# Patient Record
Sex: FEMALE | Race: WHITE | NOT HISPANIC OR LATINO
[De-identification: names, ages, dates, MRNs, and addresses within clinical notes are randomized per-mention and may not be internally consistent; named-entity substitution may affect disease eponyms.]

---

## 2018-07-03 ENCOUNTER — TRANSCRIPTION ENCOUNTER (OUTPATIENT)
Age: 63
End: 2018-07-03

## 2020-08-12 ENCOUNTER — APPOINTMENT (OUTPATIENT)
Dept: OBGYN | Facility: CLINIC | Age: 65
End: 2020-08-12

## 2020-09-10 ENCOUNTER — TRANSCRIPTION ENCOUNTER (OUTPATIENT)
Age: 65
End: 2020-09-10

## 2020-09-18 ENCOUNTER — APPOINTMENT (OUTPATIENT)
Dept: OBGYN | Facility: CLINIC | Age: 65
End: 2020-09-18

## 2020-10-02 ENCOUNTER — TRANSCRIPTION ENCOUNTER (OUTPATIENT)
Age: 65
End: 2020-10-02

## 2020-11-15 ENCOUNTER — TRANSCRIPTION ENCOUNTER (OUTPATIENT)
Age: 65
End: 2020-11-15

## 2021-02-04 ENCOUNTER — TRANSCRIPTION ENCOUNTER (OUTPATIENT)
Age: 66
End: 2021-02-04

## 2021-06-21 ENCOUNTER — APPOINTMENT (OUTPATIENT)
Dept: OBGYN | Facility: CLINIC | Age: 66
End: 2021-06-21
Payer: COMMERCIAL

## 2021-06-21 VITALS
BODY MASS INDEX: 43.54 KG/M2 | WEIGHT: 255 LBS | SYSTOLIC BLOOD PRESSURE: 148 MMHG | DIASTOLIC BLOOD PRESSURE: 84 MMHG | TEMPERATURE: 97.1 F | HEIGHT: 64 IN

## 2021-06-21 DIAGNOSIS — Z78.9 OTHER SPECIFIED HEALTH STATUS: ICD-10-CM

## 2021-06-21 DIAGNOSIS — Z01.419 ENCOUNTER FOR GYNECOLOGICAL EXAMINATION (GENERAL) (ROUTINE) W/OUT ABNORMAL FINDINGS: ICD-10-CM

## 2021-06-21 DIAGNOSIS — Z84.2 FAMILY HISTORY OF OTHER DISEASES OF THE GENITOURINARY SYSTEM: ICD-10-CM

## 2021-06-21 PROCEDURE — 99386 PREV VISIT NEW AGE 40-64: CPT

## 2021-06-21 PROCEDURE — 99072 ADDL SUPL MATRL&STAF TM PHE: CPT

## 2021-06-21 NOTE — PLAN
[FreeTextEntry1] : 65  postmenopausal F presents for annual exam\par \par #HCM\par -f/u pap/hpv\par -mammogram and dexa scan referral given \par -pt to establish care with PCP \par \par #Vulvar erythema\par -Likely fungal, Rx for nystatin sent

## 2021-06-21 NOTE — HISTORY OF PRESENT ILLNESS
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

## 2021-06-21 NOTE — PHYSICAL EXAM
[Appropriately responsive] : appropriately responsive [Alert] : alert [No Acute Distress] : no acute distress [Soft] : soft [Non-tender] : non-tender [Non-distended] : non-distended [Oriented x3] : oriented x3 [Examination Of The Breasts] : a normal appearance [No Masses] : no breast masses were palpable [Labia Majora] : normal [Labia Minora] : normal [Normal] : normal [Vulvar Atrophy] : vulvar atrophy [Atrophy] : atrophy [Uterine Adnexae] : non-palpable [FreeTextEntry1] : erythema noted on external genitalia

## 2021-06-23 LAB — HPV HIGH+LOW RISK DNA PNL CVX: NOT DETECTED

## 2021-06-25 ENCOUNTER — APPOINTMENT (OUTPATIENT)
Dept: RADIOLOGY | Facility: CLINIC | Age: 66
End: 2021-06-25

## 2021-06-25 LAB — CYTOLOGY CVX/VAG DOC THIN PREP: NORMAL

## 2021-07-02 ENCOUNTER — RESULT REVIEW (OUTPATIENT)
Age: 66
End: 2021-07-02

## 2021-07-02 ENCOUNTER — APPOINTMENT (OUTPATIENT)
Dept: MAMMOGRAPHY | Facility: CLINIC | Age: 66
End: 2021-07-02
Payer: COMMERCIAL

## 2021-07-02 ENCOUNTER — APPOINTMENT (OUTPATIENT)
Dept: RADIOLOGY | Facility: CLINIC | Age: 66
End: 2021-07-02
Payer: COMMERCIAL

## 2021-07-02 DIAGNOSIS — N95.0 POSTMENOPAUSAL BLEEDING: ICD-10-CM

## 2021-07-02 PROCEDURE — 77085 DXA BONE DENSITY AXL VRT FX: CPT

## 2021-07-02 PROCEDURE — 77063 BREAST TOMOSYNTHESIS BI: CPT

## 2021-07-02 PROCEDURE — 77067 SCR MAMMO BI INCL CAD: CPT

## 2021-07-07 ENCOUNTER — NON-APPOINTMENT (OUTPATIENT)
Age: 66
End: 2021-07-07

## 2021-08-13 PROBLEM — N95.0 POSTMENOPAUSAL BLEEDING: Status: ACTIVE | Noted: 2021-08-13

## 2021-08-16 ENCOUNTER — NON-APPOINTMENT (OUTPATIENT)
Age: 66
End: 2021-08-16

## 2021-08-26 ENCOUNTER — NON-APPOINTMENT (OUTPATIENT)
Age: 66
End: 2021-08-26

## 2021-08-26 ENCOUNTER — APPOINTMENT (OUTPATIENT)
Dept: ULTRASOUND IMAGING | Facility: CLINIC | Age: 66
End: 2021-08-26
Payer: COMMERCIAL

## 2021-08-26 PROCEDURE — 76856 US EXAM PELVIC COMPLETE: CPT

## 2021-08-26 PROCEDURE — 76830 TRANSVAGINAL US NON-OB: CPT

## 2021-08-27 ENCOUNTER — NON-APPOINTMENT (OUTPATIENT)
Age: 66
End: 2021-08-27

## 2021-08-27 ENCOUNTER — APPOINTMENT (OUTPATIENT)
Dept: ORTHOPEDIC SURGERY | Facility: CLINIC | Age: 66
End: 2021-08-27
Payer: COMMERCIAL

## 2021-08-27 VITALS — WEIGHT: 255 LBS | HEIGHT: 64 IN | BODY MASS INDEX: 43.54 KG/M2

## 2021-08-27 PROCEDURE — 99204 OFFICE O/P NEW MOD 45 MIN: CPT

## 2021-08-27 PROCEDURE — 73502 X-RAY EXAM HIP UNI 2-3 VIEWS: CPT | Mod: LT

## 2021-08-27 NOTE — DISCUSSION/SUMMARY
[de-identified] : This patient is severe left hip osteoarthritis.  The patient is not an appropriate candidate for surgical intervention at this time because her BMI is greater than 40 increasing surgical risk. An extensive discussion was conducted on the natural history of the disease and the variety of surgical and non-surgical options available to the patient including, but not limited to non-steroidal anti-inflammatory medications, steroid injections, physical therapy, maintenance of ideal body weight, and reduction of activity.  I prescribed a course of meloxicam.\par The patient will schedule an appointment as needed.\par \par The patient has been counseled regarding the elevated risks associated with surgical complications in patients with a BMI>35.  I explained to the patient the risk of obesity, which is well documented in the orthopedic literature as increasing risks of wound breakdown (dehiscence), drainage, periprosthetic joint infection, dissatisfaction, chronic pain, early failure and/or loosening of the prosthesis, and impaired overall outcome to the patient. The patient demonstrates a profound understanding of the increased risk. Nutritionist referral, methods of monitoring nutrition intake and calorie counting and bariatric surgery options were discussed with the patient. After a lengthy discussion, the patient agreed to make a coordinated effort at weight loss. The patient understands our BMI policy and if they are planning surgical intervention, then they will need to bring their BMI below 40 in order to proceed and they are agreeable to this.

## 2021-08-27 NOTE — HISTORY OF PRESENT ILLNESS
[de-identified] : This is very nice 65 year-old female experiencing left hip and groin and thigh pain, which is severe in intensity. The pain substantially limits activities of daily living. Walking tolerance is reduced. Medication and activity modification have been minimally effective for a period lasting greater than three months in duration. Assistive devices and external support were not deemed by the patient to be helpful in improving their function. Due to the severity of osteoarthritis and level of pain, physical therapy is contraindicated. Pain and restriction of function are intolerable at this time. The patient denies any radiation of the pain to the feet and it is not associated with numbness, tingling, or weakness.  She tried a left hip intra-articular cortisone injection which made the pain worse.  No attempts at weight loss and BMI is 44.

## 2021-08-27 NOTE — PHYSICAL EXAM
[de-identified] : Patient is well nourished, well-developed, in no acute distress, with appropriate mood and affect. The patient is oriented to time, place, and person. Respirations are even and unlabored. Gait evaluation reveals a limp. There is no inguinal adenopathy. Examination of the contralateral hip shows normal range of motion, strength, no tenderness, and intact skin. The affected limb is well-perfused, shows a grossly normal motor and sensory examination. Examination of the hip shows no skin lesions. Hip motion is reduced and causes pain. FADIR is positive and MURRAY is positive. Stinchfield test is positive. Leg lengths are approximately equal. Both hips are stable and muscle strength is normal. Pedal pulses are palpable. [de-identified] : AP and lateral x-rays of the left hip, pelvis, and femur were ordered and taken in the office and demonstrate degenerative joint disease of the hip with joint space narrowing, osteophyte formation, and subchondral sclerosis.\par \par The patient brings with her an MRI of the left hip.  Reviewed the MR imaging with the patient was demonstrates severe left hip osteoarthritis.

## 2021-09-08 ENCOUNTER — NON-APPOINTMENT (OUTPATIENT)
Age: 66
End: 2021-09-08

## 2021-09-10 ENCOUNTER — NON-APPOINTMENT (OUTPATIENT)
Age: 66
End: 2021-09-10

## 2021-09-10 ENCOUNTER — APPOINTMENT (OUTPATIENT)
Dept: INTERNAL MEDICINE | Facility: CLINIC | Age: 66
End: 2021-09-10
Payer: COMMERCIAL

## 2021-09-10 VITALS — DIASTOLIC BLOOD PRESSURE: 84 MMHG | WEIGHT: 236 LBS | BODY MASS INDEX: 40.51 KG/M2 | SYSTOLIC BLOOD PRESSURE: 145 MMHG

## 2021-09-10 DIAGNOSIS — M25.552 PAIN IN LEFT HIP: ICD-10-CM

## 2021-09-10 PROCEDURE — G0442 ANNUAL ALCOHOL SCREEN 15 MIN: CPT

## 2021-09-10 PROCEDURE — 99386 PREV VISIT NEW AGE 40-64: CPT | Mod: 25

## 2021-09-10 PROCEDURE — G0444 DEPRESSION SCREEN ANNUAL: CPT | Mod: 59

## 2021-09-13 PROBLEM — M25.552 LEFT HIP PAIN: Status: ACTIVE | Noted: 2021-09-13

## 2021-09-13 NOTE — ASSESSMENT
[FreeTextEntry1] : Chronic use of GH supplement (Norditropin) - she reports has been taking this since age 50 when 'profound deficiency' was discovered. would like records from prescribing provider.\par \par Annual alcohol screen completed this visit. Alcohol use within healthy limits.  Healthy drinking guidelines shared with patient (Male no more than 4 SSD on any day, no more than 14 SSD per week; Female and male overage 65 no more than 3 SSD on any day, no more than 7 per week). Positive reinforcement provided given patient currently within healthy guidelines. \par \par Annual depression screen completed this visit and reviewed.  Screening not suggestive of diagnosis of MDD.

## 2021-09-13 NOTE — HEALTH RISK ASSESSMENT
[Patient reported PAP Smear was normal] : Patient reported PAP Smear was normal [Patient reported bone density results were normal] : Patient reported bone density results were normal [Never (0 pts)] : Never (0 points) [No falls in past year] : Patient reported no falls in the past year [0] : 2) Feeling down, depressed, or hopeless: Not at all (0) [No Retinopathy] : No retinopathy [Patient reported colonoscopy was normal] : Patient reported colonoscopy was normal [HIV test declined] : HIV test declined [None] : None [Alone] : lives alone [Employed] : employed [Single] : single [Sexually Active] : sexually active [Feels Safe at Home] : Feels safe at home [Fully functional (bathing, dressing, toileting, transferring, walking, feeding)] : Fully functional (bathing, dressing, toileting, transferring, walking, feeding) [Fully functional (using the telephone, shopping, preparing meals, housekeeping, doing laundry, using] : Fully functional and needs no help or supervision to perform IADLs (using the telephone, shopping, preparing meals, housekeeping, doing laundry, using transportation, managing medications and managing finances) [Smoke Detector] : smoke detector [Seat Belt] :  uses seat belt [With Patient/Caregiver] : , with patient/caregiver [Name: ___] : Health Care Proxy's Name: [unfilled]  [Relationship: ___] : Relationship: [unfilled] [No] : No [PHQ-2 Negative - No further assessment needed] : PHQ-2 Negative - No further assessment needed [] : No [de-identified] : stopped ETOH completely 2 weeks ago.  [de-identified] : walking in the pool - 5 times a week [de-identified] : trying "17 day diet" - high protein, low sugur - concept close to [place] diet. today is day 9 [[id number]] : 0 [EyeExamDate] : 09/20 [Change in mental status noted] : No change in mental status noted [Reports changes in hearing] : Reports no changes in hearing [Reports changes in vision] : Reports no changes in vision [Carbon Monoxide Detector] : no carbon monoxide detector [MammogramDate] : 07/21 [MammogramComments] : josh 3 - f/u in 6 months [PapSmearDate] : 06/21 [BoneDensityDate] : 07/21 [ColonoscopyDate] : 01/2019 [ColonoscopyComments] : Dr Redd [de-identified] : with cat [AdvancecareDate] : 09/21

## 2021-09-13 NOTE — PHYSICAL EXAM
[No Acute Distress] : no acute distress [Normal Sclera/Conjunctiva] : normal sclera/conjunctiva [EOMI] : extraocular movements intact [No Respiratory Distress] : no respiratory distress  [Clear to Auscultation] : lungs were clear to auscultation bilaterally [Normal Rate] : normal rate  [Regular Rhythm] : with a regular rhythm [Normal S1, S2] : normal S1 and S2 [Pedal Pulses Present] : the pedal pulses are present [No Edema] : there was no peripheral edema [Normal Appearance] : normal in appearance [No Masses] : no palpable masses [No Nipple Discharge] : no nipple discharge [No Axillary Lymphadenopathy] : no axillary lymphadenopathy [Soft] : abdomen soft [Non Tender] : non-tender [Normal Bowel Sounds] : normal bowel sounds [Normal Anterior Cervical Nodes] : no anterior cervical lymphadenopathy [No CVA Tenderness] : no CVA  tenderness [No Spinal Tenderness] : no spinal tenderness [Grossly Normal Strength/Tone] : grossly normal strength/tone [No Rash] : no rash [Coordination Grossly Intact] : coordination grossly intact [No Focal Deficits] : no focal deficits [Normal Affect] : the affect was normal

## 2021-09-13 NOTE — HISTORY OF PRESENT ILLNESS
[FreeTextEntry1] : New patient to establish medical care and for annual CPE.\par under pressure to lose weight for upcoming hip surgery - she would like to begin medication to support her wt loss asap.  [de-identified] : Left hip pain since march - probably needs THR - she has been told she has to get wt off for them to do surgery.y - That is the issue that is most impt to her. \par RE hip - does exercise in pool on a regular basis. has tried injections, acupuncture, \par RE wt loss: has cut out sugar and ETOH. She is under pressure to lose enough wt to proceed with surgery.  Also feels pressure as she needs to return to in person work which requires her to be able to stand, walk, function better than she is doing with hip in its current shape. \par She wants to use medication to support her efforts - feels she has not been able to make sufficient progress without help. \par Was given meloxicam for pain which is helping her. \par Does meditation daily - inside timer oniel\par \jaxon Reports she has been taking GH (Norditropin) since age 50.

## 2021-09-23 ENCOUNTER — APPOINTMENT (OUTPATIENT)
Dept: OBGYN | Facility: CLINIC | Age: 66
End: 2021-09-23
Payer: COMMERCIAL

## 2021-09-23 VITALS
WEIGHT: 236 LBS | DIASTOLIC BLOOD PRESSURE: 85 MMHG | HEIGHT: 64 IN | SYSTOLIC BLOOD PRESSURE: 147 MMHG | BODY MASS INDEX: 40.29 KG/M2

## 2021-09-23 PROCEDURE — 58100 BIOPSY OF UTERUS LINING: CPT

## 2021-09-23 NOTE — PROCEDURE
[Endometrial Biopsy] : Endometrial biopsy [Irregular Bleeding] : irregular uterine bleeding [Post-Menop. Bleeding] : post-menopausal bleeding [Risks] : risks [Benefits] : benefits [Alternatives] : alternatives [Patient] : patient [Infection] : infection [Uterine Perforation] : uterine perforation [Bleeding] : bleeding [No Premedication] : No premedication [Paracervical Block] : paracervical block was performed [___ mL Injected] : [unfilled] ~UmL of lidocaine [0.01] : 0.01 [Tenaculum] : Tenaculum [Easy Passage] : Easy passage [Sounded to ___ cm] : sounded to [unfilled] ~Ucm [Anteverted] : anteverted [Scant] : scant [Sent to Pathology] : placed in buffered formalin and sent for pathology [Tolerated Well] : Patient tolerated the procedure well [No Complications] : No complications

## 2021-09-24 ENCOUNTER — APPOINTMENT (OUTPATIENT)
Dept: BARIATRICS/WEIGHT MGMT | Facility: CLINIC | Age: 66
End: 2021-09-24
Payer: COMMERCIAL

## 2021-09-24 PROCEDURE — 99205 OFFICE O/P NEW HI 60 MIN: CPT | Mod: 95

## 2021-09-24 RX ORDER — SOMATROPIN 5 MG/1.5ML
5 INJECTION, SOLUTION SUBCUTANEOUS
Refills: 0 | Status: ACTIVE | COMMUNITY
Start: 2021-09-24

## 2021-09-30 LAB — CORE LAB BIOPSY: NORMAL

## 2021-10-01 ENCOUNTER — APPOINTMENT (OUTPATIENT)
Dept: BARIATRICS/WEIGHT MGMT | Facility: CLINIC | Age: 66
End: 2021-10-01
Payer: COMMERCIAL

## 2021-10-01 PROCEDURE — 99214 OFFICE O/P EST MOD 30 MIN: CPT | Mod: 95

## 2021-10-01 NOTE — ASSESSMENT
[FreeTextEntry1] : Plan: \par \par Continue whole food migrate to more plant based plus fish\par avoid processed foods\par would add vegetables to lunch\par recommended fruit 2-3 a day , minimize shakes \par will reach out to pharmacy regarding saxenda prescription\par \par f/u 1 week TEB

## 2021-10-01 NOTE — HISTORY OF PRESENT ILLNESS
[FreeTextEntry1] : Has not weighed herself, will be going next week for pre-surgical testing. \par \par Patient has been having increased left hip pain- which needs replacing\par Reports increase fatigue \par \par She went to the office 1 day this week, the commute was very hard on her body. Needs to go in every 2 weeks \par She had 2 glasses of wine that night \par Decreased coffee to 1 day a week with almond milk\par Purchased non-dairy milks etc, and more whole food plant based foods \par B: eggs and orange\par L: 2 fruit smoothie with protein powder smoothie \par D: salmon/chicken with broccoli, piece of fruit \par \par She had an endometrial lining biopsy, no cancer but needs a D&C at the end of this month \par \par Started taking CBD oil on sunday for sleep and pain \par \par Taking Colace for BM\par stopped green tea\par having more regular bowel movements

## 2021-10-07 ENCOUNTER — OUTPATIENT (OUTPATIENT)
Dept: OUTPATIENT SERVICES | Facility: HOSPITAL | Age: 66
LOS: 1 days | End: 2021-10-07

## 2021-10-07 VITALS
SYSTOLIC BLOOD PRESSURE: 147 MMHG | WEIGHT: 233.91 LBS | RESPIRATION RATE: 14 BRPM | HEART RATE: 75 BPM | TEMPERATURE: 99 F | OXYGEN SATURATION: 98 % | HEIGHT: 63.5 IN | DIASTOLIC BLOOD PRESSURE: 74 MMHG

## 2021-10-07 DIAGNOSIS — N84.0 POLYP OF CORPUS UTERI: ICD-10-CM

## 2021-10-07 DIAGNOSIS — Z98.890 OTHER SPECIFIED POSTPROCEDURAL STATES: Chronic | ICD-10-CM

## 2021-10-07 LAB
ANION GAP SERPL CALC-SCNC: 15 MMOL/L — HIGH (ref 7–14)
BUN SERPL-MCNC: 31 MG/DL — HIGH (ref 7–23)
CALCIUM SERPL-MCNC: 10.2 MG/DL — SIGNIFICANT CHANGE UP (ref 8.4–10.5)
CHLORIDE SERPL-SCNC: 102 MMOL/L — SIGNIFICANT CHANGE UP (ref 98–107)
CO2 SERPL-SCNC: 24 MMOL/L — SIGNIFICANT CHANGE UP (ref 22–31)
CREAT SERPL-MCNC: 0.82 MG/DL — SIGNIFICANT CHANGE UP (ref 0.5–1.3)
GLUCOSE SERPL-MCNC: 78 MG/DL — SIGNIFICANT CHANGE UP (ref 70–99)
HCT VFR BLD CALC: 40.8 % — SIGNIFICANT CHANGE UP (ref 34.5–45)
HGB BLD-MCNC: 13.8 G/DL — SIGNIFICANT CHANGE UP (ref 11.5–15.5)
MCHC RBC-ENTMCNC: 33.8 GM/DL — SIGNIFICANT CHANGE UP (ref 32–36)
MCHC RBC-ENTMCNC: 33.9 PG — SIGNIFICANT CHANGE UP (ref 27–34)
MCV RBC AUTO: 100.2 FL — HIGH (ref 80–100)
NRBC # BLD: 0 /100 WBCS — SIGNIFICANT CHANGE UP
NRBC # FLD: 0 K/UL — SIGNIFICANT CHANGE UP
PLATELET # BLD AUTO: 256 K/UL — SIGNIFICANT CHANGE UP (ref 150–400)
POTASSIUM SERPL-MCNC: 4.3 MMOL/L — SIGNIFICANT CHANGE UP (ref 3.5–5.3)
POTASSIUM SERPL-SCNC: 4.3 MMOL/L — SIGNIFICANT CHANGE UP (ref 3.5–5.3)
RBC # BLD: 4.07 M/UL — SIGNIFICANT CHANGE UP (ref 3.8–5.2)
RBC # FLD: 12 % — SIGNIFICANT CHANGE UP (ref 10.3–14.5)
SODIUM SERPL-SCNC: 141 MMOL/L — SIGNIFICANT CHANGE UP (ref 135–145)
WBC # BLD: 9.12 K/UL — SIGNIFICANT CHANGE UP (ref 3.8–10.5)
WBC # FLD AUTO: 9.12 K/UL — SIGNIFICANT CHANGE UP (ref 3.8–10.5)

## 2021-10-07 NOTE — H&P PST ADULT - MUSCULOSKELETAL COMMENTS
left hip, and knee arthritis-  will have hip replacement in the next few months left hip pain with rom

## 2021-10-07 NOTE — H&P PST ADULT - PROBLEM SELECTOR PLAN 1
Pt scheduled for dilation curettage, operative hysteroscopy, polypectomy with myosure on 10/21/2021.  labs done results pending, ekg in chart.  Pt scheduled for preop covid testing.  Preop teaching done, pt able to verbalize understanding.  anesthesia- TIA Precautions     pst request  echo 2006 [facility]

## 2021-10-07 NOTE — H&P PST ADULT - NSANTHOSAYNRD_GEN_A_CORE
tested neg/No. TIA screening performed.  STOP BANG Legend: 0-2 = LOW Risk; 3-4 = INTERMEDIATE Risk; 5-8 = HIGH Risk tested neg, neck 17 inches/No. TIA screening performed.  STOP BANG Legend: 0-2 = LOW Risk; 3-4 = INTERMEDIATE Risk; 5-8 = HIGH Risk

## 2021-10-07 NOTE — H&P PST ADULT - HISTORY OF PRESENT ILLNESS
64y/o female scheduled for dilation curettage operative hysterectomy, polypectomy with myosure on 10/21/2021.  Pt states, "developed postmenopausal bleeding 7/2021 for several days, US endometrial thickening,  Endometrial bx neg." 64y/o female scheduled for dilation curettage operative hysterectomy, polypectomy with myosure on 10/21/2021.  Pt states, "developed postmenopausal bleeding 7/2021 for several days, US endometrial thickening, polyp,  Endometrial bx neg."

## 2021-10-07 NOTE — H&P PST ADULT - NSICDXPASTMEDICALHX_GEN_ALL_CORE_FT
PAST MEDICAL HISTORY:  Arthritis left hip    Growth hormone deficiency (human)     Obese     Polyp of corpus uteri

## 2021-10-07 NOTE — H&P PST ADULT - NSICDXPASTSURGICALHX_GEN_ALL_CORE_FT
PAST SURGICAL HISTORY:  S/P bilateral breast reduction 2005    S/P dilatation and curettage 2014, 2017

## 2021-10-08 ENCOUNTER — APPOINTMENT (OUTPATIENT)
Dept: BARIATRICS/WEIGHT MGMT | Facility: CLINIC | Age: 66
End: 2021-10-08
Payer: COMMERCIAL

## 2021-10-08 VITALS — WEIGHT: 235 LBS | HEIGHT: 64 IN | BODY MASS INDEX: 40.12 KG/M2

## 2021-10-08 PROCEDURE — 99443: CPT

## 2021-10-08 NOTE — HISTORY OF PRESENT ILLNESS
[FreeTextEntry1] : This is a 65 year female  present in office today for initial weight management visit. 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 \par She has not been eating any pasta, bread, fruit, no alcohol and minimal caffeine in the last 3 weeks \par Reporting constipation, on meloxicam taking colace \par \par She's on a "17 day diet" \par Takes flaxseed oil and lemon water in morning \par B: low fat cottage cheese with fruit, 2 eggs and fat free sour cream and salmon \par L: fruit smoothie with yogurt with protein powder, omelette with goat cheese, GF pasta with red sauce, chicken piccata with broccoli , smoked salmon    \par 730pm D: salmon with vegetable, soups from [employer] vegetable soup, corn tortilla soup, amys organic \par 1/2 cottage cheese and fruit \par Drinks green tea 2x/day\par Drinks at least 8 -16 ounce jesika spring bottles a day \par \par Sleeps poorly, wakes up 2-3x/day to use the bathroom   \par \par \par Does not have a car, takes cabs \par \par \par Exercises\par \par denies cigarette, denies  recreational drugs \par \par

## 2021-10-14 ENCOUNTER — APPOINTMENT (OUTPATIENT)
Dept: ORTHOPEDIC SURGERY | Facility: CLINIC | Age: 66
End: 2021-10-14

## 2021-10-17 DIAGNOSIS — Z01.818 ENCOUNTER FOR OTHER PREPROCEDURAL EXAMINATION: ICD-10-CM

## 2021-10-18 ENCOUNTER — APPOINTMENT (OUTPATIENT)
Dept: DISASTER EMERGENCY | Facility: CLINIC | Age: 66
End: 2021-10-18

## 2021-10-19 ENCOUNTER — APPOINTMENT (OUTPATIENT)
Dept: BARIATRICS/WEIGHT MGMT | Facility: CLINIC | Age: 66
End: 2021-10-19
Payer: COMMERCIAL

## 2021-10-19 VITALS — BODY MASS INDEX: 39.09 KG/M2 | WEIGHT: 229 LBS | HEIGHT: 64 IN

## 2021-10-19 PROBLEM — E23.0 HYPOPITUITARISM: Chronic | Status: ACTIVE | Noted: 2021-10-07

## 2021-10-19 PROBLEM — N84.0 POLYP OF CORPUS UTERI: Chronic | Status: ACTIVE | Noted: 2021-10-07

## 2021-10-19 PROBLEM — E66.9 OBESITY, UNSPECIFIED: Chronic | Status: ACTIVE | Noted: 2021-10-07

## 2021-10-19 PROBLEM — M19.90 UNSPECIFIED OSTEOARTHRITIS, UNSPECIFIED SITE: Chronic | Status: ACTIVE | Noted: 2021-10-07

## 2021-10-19 LAB — SARS-COV-2 N GENE NPH QL NAA+PROBE: NOT DETECTED

## 2021-10-19 PROCEDURE — 99443: CPT

## 2021-10-20 ENCOUNTER — TRANSCRIPTION ENCOUNTER (OUTPATIENT)
Age: 66
End: 2021-10-20

## 2021-10-21 ENCOUNTER — RESULT REVIEW (OUTPATIENT)
Age: 66
End: 2021-10-21

## 2021-10-21 ENCOUNTER — OUTPATIENT (OUTPATIENT)
Dept: OUTPATIENT SERVICES | Facility: HOSPITAL | Age: 66
LOS: 1 days | Discharge: HOME | End: 2021-10-21
Payer: COMMERCIAL

## 2021-10-21 ENCOUNTER — APPOINTMENT (OUTPATIENT)
Dept: OBGYN | Facility: CLINIC | Age: 66
End: 2021-10-21

## 2021-10-21 VITALS
OXYGEN SATURATION: 99 % | RESPIRATION RATE: 18 BRPM | DIASTOLIC BLOOD PRESSURE: 54 MMHG | TEMPERATURE: 98 F | SYSTOLIC BLOOD PRESSURE: 145 MMHG | HEART RATE: 67 BPM

## 2021-10-21 VITALS
HEART RATE: 65 BPM | TEMPERATURE: 98 F | SYSTOLIC BLOOD PRESSURE: 141 MMHG | OXYGEN SATURATION: 99 % | WEIGHT: 233.91 LBS | DIASTOLIC BLOOD PRESSURE: 62 MMHG | HEIGHT: 63.5 IN | RESPIRATION RATE: 18 BRPM

## 2021-10-21 DIAGNOSIS — N84.0 POLYP OF CORPUS UTERI: ICD-10-CM

## 2021-10-21 DIAGNOSIS — Z98.890 OTHER SPECIFIED POSTPROCEDURAL STATES: Chronic | ICD-10-CM

## 2021-10-21 PROCEDURE — 88305 TISSUE EXAM BY PATHOLOGIST: CPT | Mod: 26

## 2021-10-21 PROCEDURE — 58558 HYSTEROSCOPY BIOPSY: CPT

## 2021-10-21 NOTE — ASU PREOPERATIVE ASSESSMENT, ADULT (IPARK ONLY) - PRO INTERPRETER NEED 2
Quality 130: Documentation Of Current Medications In The Medical Record: Current Medications Documented
Quality 111:Pneumonia Vaccination Status For Older Adults: Pneumococcal Vaccination not Administered or Previously Received, Reason not Otherwise Specified
Detail Level: Detailed
Quality 110: Preventive Care And Screening: Influenza Immunization: Influenza Immunization not Administered for Documented Reasons.
English

## 2021-10-21 NOTE — ASU DISCHARGE PLAN (ADULT/PEDIATRIC) - CARE PROVIDER_API CALL
Mrs. Smith presents for evaluation of colon cancer screening and GERD. Her last EGD and Colonoscopy were in 2015 by Dr. Steve with reflux esophagitis and two SSA. She is due for repeat colonoscopy. She is on omeprazole 20mg daily and NSAIDs 2-3 times a week for arthritis. She wants to wean off but can't. She does have breakthrough reflux occasionally throughout the week. Today she is doing well otherwise. MB  8/26/2020 Mrs. Smith presents for endoscopy follow up. Her EGD reveals 2 cm hiatal hernia and mild esophagitis, her biopsies are pending. Her colonsocpy is normal other than diverticular disease. She is on low dose PPI daily and pepcid as needed. Her bowels are moving daily on low dose fiber. Today she is doing well otherwise. MB
Include Z78.9 (Other Specified Conditions Influencing Health Status) As An Associated Diagnosis?: No
Virginia Manzanares)  OBSGYN  General  [address], [address]  [place] [address]  Phone: [phone]  Fax: [phone]  Follow Up Time: 2 weeks  
negative...

## 2021-10-21 NOTE — ASU DISCHARGE PLAN (ADULT/PEDIATRIC) - MEDICATION INSTRUCTIONS
Tylenol, ibuprofen Tylenol, ibuprofen you may take next dose of tylenol at 4pm motrin can be taken at 430pm

## 2021-10-21 NOTE — ASU DISCHARGE PLAN (ADULT/PEDIATRIC) - ACTIVITY LEVEL
No heavy lifting/Nothing per rectum/Nothing per vagina/No tub baths/No douching/No tampons/No intercourse 2 weeks/No heavy lifting/No sports/gym/Nothing per rectum/Nothing per vagina/No tub baths/No douching/No tampons/No intercourse

## 2021-10-21 NOTE — ASU DISCHARGE PLAN (ADULT/PEDIATRIC) - ASU DC SPECIAL INSTRUCTIONSFT
Return to your regular way of eating.  Resume normal activity as tolerated, but no heavy lifting or strenuous activity for 2 weeks.  No driving for next 2 weeks and/or while on narcotic pain medication.  Complete vaginal rest, no tampons, no douching, no tub bathing, no sexual activities for 2 weeks unless otherwise instructed by your doctor.  Call your doctor with any signs and symptoms of infection such as fever, chills, nausea or vomiting.  Call your doctor with redness or swelling at the incision site, fluid leakage or wound separation.  Call your doctor if you're unable to tolerate food or have difficulty urinating.  Call your doctor if you have pain that is not relieved by your prescribed medications.  Notify your doctor with any other concerns.  Follow up with Dr. Manzanares in 2 weeks.

## 2021-10-21 NOTE — BRIEF OPERATIVE NOTE - OPERATION/FINDINGS
Grossly normal external female genitalia. Exam under anesthesia revealed 6 week size retroverted uterus. The uterine cavity was visualized via hysteroscopy, the ostia were located and noted to be within normal limits bilaterally. Polypoid tissue noted globally with 0.5 cm calcified polyp noted in anterior uterine wall. Polyp forceps used to remove scant tissue. Dilation and curettage performed with polyp and endometrial tissue obtained and sent to pathology Grossly normal external female genitalia. Exam under anesthesia revealed 6 week size retroverted uterus. The uterine cavity was visualized via hysteroscopy, the ostia were located and noted to be within normal limits bilaterally. Polypoid tissue noted globally with 0.5 cm calcified polyp noted in anterior uterine wall. Polyp forceps used to remove scant tissue. Dilation and curettage performed with polyp and endometrial tissue obtained and sent to pathology.    Dictation #: [id number]

## 2021-10-21 NOTE — BRIEF OPERATIVE NOTE - IV INFUSIONS - CRYSTALLOIDS
The patient has been examined and the H&P has been reviewed:    I concur with the findings and no changes have occurred since H&P was written.    Anesthesia/Surgery risks, benefits and alternative options discussed and understood by patient/family.          Active Hospital Problems    Diagnosis  POA    Recurrent otitis media [H66.90]  Yes      Resolved Hospital Problems    Diagnosis Date Resolved POA   No resolved problems to display.      750 cc NS Merly Cortez  (RN)  2021 18:25:10

## 2021-10-21 NOTE — ASU DISCHARGE PLAN (ADULT/PEDIATRIC) - PROCEDURE
dilation and curettage, hysteroscopy, polypectomy with myosure device dilation and curettage, hysteroscopy, polypectomy

## 2021-10-21 NOTE — BRIEF OPERATIVE NOTE - NSICDXBRIEFPROCEDURE_GEN_ALL_CORE_FT
PROCEDURES:  Polypectomy, hysteroscopic, uterus, with dilation and curettage 21-Oct-2021 12:41:05  Phyllis Centeno

## 2021-10-22 ENCOUNTER — NON-APPOINTMENT (OUTPATIENT)
Age: 66
End: 2021-10-22

## 2021-10-25 LAB — SURGICAL PATHOLOGY STUDY: SIGNIFICANT CHANGE UP

## 2021-10-27 ENCOUNTER — APPOINTMENT (OUTPATIENT)
Dept: BARIATRICS/WEIGHT MGMT | Facility: CLINIC | Age: 66
End: 2021-10-27

## 2021-10-29 ENCOUNTER — NON-APPOINTMENT (OUTPATIENT)
Age: 66
End: 2021-10-29

## 2021-11-02 NOTE — ASSESSMENT
How Severe Are Your Spot(S)?: mild
[FreeTextEntry1] : BARIATRIC SURGERY HISTORY: none\par OBESITY CO-MORBIDITIES: HTN\par ANTI-OBESITY MEDICATIONS: none\par OBESITY MEDICATION SIDE EFFECTS: n/a \par \par Plan: \par Recommend whole food mainly plant based plus fish \par no dairy\par avoid refined carbohydrates and add fat and sugar \par emailed plate and other resources \par prepare food in advance\par keep a food journal \par reviewed blood work \par start saxenda 0.6mg- discussed potential side effects, patient verbalizes understanding. \par return back to gym for water exercises (patient needs a hip replacement) \par \par f/u 1 week \par  
What Is The Reason For Today's Visit?: Full Body Skin Examination
What Is The Reason For Today's Visit? (Being Monitored For X): concerning skin lesions on an annual basis

## 2021-11-23 ENCOUNTER — RX RENEWAL (OUTPATIENT)
Age: 66
End: 2021-11-23

## 2021-12-27 ENCOUNTER — NON-APPOINTMENT (OUTPATIENT)
Age: 66
End: 2021-12-27

## 2021-12-28 ENCOUNTER — APPOINTMENT (OUTPATIENT)
Dept: INTERNAL MEDICINE | Facility: CLINIC | Age: 66
End: 2021-12-28
Payer: COMMERCIAL

## 2021-12-28 VITALS
DIASTOLIC BLOOD PRESSURE: 80 MMHG | HEIGHT: 64 IN | OXYGEN SATURATION: 96 % | RESPIRATION RATE: 15 BRPM | WEIGHT: 224 LBS | HEART RATE: 79 BPM | SYSTOLIC BLOOD PRESSURE: 138 MMHG | BODY MASS INDEX: 38.24 KG/M2

## 2021-12-28 DIAGNOSIS — M16.12 UNILATERAL PRIMARY OSTEOARTHRITIS, LEFT HIP: ICD-10-CM

## 2021-12-28 DIAGNOSIS — R03.0 ELEVATED BLOOD-PRESSURE READING, W/OUT DIAGNOSIS OF HYPERTENSION: ICD-10-CM

## 2021-12-28 PROCEDURE — 99213 OFFICE O/P EST LOW 20 MIN: CPT

## 2021-12-29 ENCOUNTER — RESULT REVIEW (OUTPATIENT)
Age: 66
End: 2021-12-29

## 2022-01-03 ENCOUNTER — APPOINTMENT (OUTPATIENT)
Dept: INTERNAL MEDICINE | Facility: CLINIC | Age: 67
End: 2022-01-03

## 2022-01-05 ENCOUNTER — RESULT REVIEW (OUTPATIENT)
Age: 67
End: 2022-01-05

## 2022-01-05 ENCOUNTER — APPOINTMENT (OUTPATIENT)
Dept: MAMMOGRAPHY | Facility: CLINIC | Age: 67
End: 2022-01-05
Payer: COMMERCIAL

## 2022-01-05 ENCOUNTER — APPOINTMENT (OUTPATIENT)
Dept: ULTRASOUND IMAGING | Facility: CLINIC | Age: 67
End: 2022-01-05
Payer: COMMERCIAL

## 2022-01-05 DIAGNOSIS — N64.89 OTHER SPECIFIED DISORDERS OF BREAST: ICD-10-CM

## 2022-01-05 PROCEDURE — 76642 ULTRASOUND BREAST LIMITED: CPT | Mod: LT

## 2022-01-05 PROCEDURE — G0279: CPT

## 2022-01-05 PROCEDURE — 77066 DX MAMMO INCL CAD BI: CPT

## 2022-01-06 DIAGNOSIS — R92.8 OTHER ABNORMAL AND INCONCLUSIVE FINDINGS ON DIAGNOSTIC IMAGING OF BREAST: ICD-10-CM

## 2022-01-10 ENCOUNTER — RESULT REVIEW (OUTPATIENT)
Age: 67
End: 2022-01-10

## 2022-01-10 ENCOUNTER — APPOINTMENT (OUTPATIENT)
Dept: ULTRASOUND IMAGING | Facility: CLINIC | Age: 67
End: 2022-01-10
Payer: COMMERCIAL

## 2022-01-10 ENCOUNTER — APPOINTMENT (OUTPATIENT)
Dept: MAMMOGRAPHY | Facility: CLINIC | Age: 67
End: 2022-01-10
Payer: COMMERCIAL

## 2022-01-10 PROCEDURE — 19083 BX BREAST 1ST LESION US IMAG: CPT | Mod: LT

## 2022-01-10 PROCEDURE — 77065 DX MAMMO INCL CAD UNI: CPT | Mod: LT

## 2022-01-14 ENCOUNTER — NON-APPOINTMENT (OUTPATIENT)
Age: 67
End: 2022-01-14

## 2022-01-17 ENCOUNTER — APPOINTMENT (OUTPATIENT)
Dept: SURGICAL ONCOLOGY | Facility: CLINIC | Age: 67
End: 2022-01-17
Payer: COMMERCIAL

## 2022-01-17 ENCOUNTER — NON-APPOINTMENT (OUTPATIENT)
Age: 67
End: 2022-01-17

## 2022-01-17 VITALS
DIASTOLIC BLOOD PRESSURE: 88 MMHG | HEART RATE: 81 BPM | TEMPERATURE: 97.7 F | SYSTOLIC BLOOD PRESSURE: 164 MMHG | HEIGHT: 64 IN | OXYGEN SATURATION: 96 % | BODY MASS INDEX: 38.24 KG/M2 | RESPIRATION RATE: 15 BRPM | WEIGHT: 224 LBS

## 2022-01-17 PROCEDURE — 99205 OFFICE O/P NEW HI 60 MIN: CPT

## 2022-01-18 PROBLEM — M16.12 ARTHRITIS OF LEFT HIP: Status: ACTIVE | Noted: 2021-08-27

## 2022-01-18 PROBLEM — R03.0 BORDERLINE HYPERTENSION: Status: ACTIVE | Noted: 2021-09-10

## 2022-01-18 NOTE — PHYSICAL EXAM
[No Acute Distress] : no acute distress [Well Nourished] : well nourished [Well Developed] : well developed [Well-Appearing] : well-appearing [No JVD] : no jugular venous distention [Supple] : supple [No Respiratory Distress] : no respiratory distress  [No Accessory Muscle Use] : no accessory muscle use [Clear to Auscultation] : lungs were clear to auscultation bilaterally [Normal Percussion] : the chest was normal to percussion [Normal Rate] : normal rate  [Regular Rhythm] : with a regular rhythm [Normal S1, S2] : normal S1 and S2 [No Murmur] : no murmur heard [No Carotid Bruits] : no carotid bruits [No Edema] : there was no peripheral edema [No Extremity Clubbing/Cyanosis] : no extremity clubbing/cyanosis

## 2022-01-18 NOTE — ASSESSMENT
[FreeTextEntry1] : 1) encouraged flu vacc.  Has had COVID vacc x 3, #3 date entered.  2) pushing further exercise, walking, wt loss efforts for borderline bp.  3) did very well post THR on L.  4) all HCM/screening utd - having 6 mo f/u mammo upcoming, PAP/colon screen nicely utd.

## 2022-01-18 NOTE — HISTORY OF PRESENT ILLNESS
[FreeTextEntry1] : Here to f/u efforts to lose weight before hip replacement, and to f/u borderline bp [de-identified] : Patient is a good friend of a patient of mine, would like to switch PCP to me.  Had been in touch with her through friend to help her get earlier appointment to obesity medicine, so that she could start GLP1 asap to achieve a preop weight goal before hip replacement.  On saxenda - did well w wt loss of 30 pounds.  Did her surgery and is very happy - has little to no discomfort, moving well, back to normal routine.  Also from review of chart needed next bp check.

## 2022-01-18 NOTE — COUNSELING
[Benefits of weight loss discussed] : Benefits of weight loss discussed [Encouraged to increase physical activity] : Encouraged to increase physical activity [Decrease Portions] : decrease portions [____ min/wk Activity] : [unfilled] min/wk activity [Good understanding] : Patient has a good understanding of disease, goals and obesity follow-up plan

## 2022-01-21 ENCOUNTER — APPOINTMENT (OUTPATIENT)
Dept: MRI IMAGING | Facility: IMAGING CENTER | Age: 67
End: 2022-01-21
Payer: COMMERCIAL

## 2022-01-21 ENCOUNTER — OUTPATIENT (OUTPATIENT)
Dept: OUTPATIENT SERVICES | Facility: HOSPITAL | Age: 67
LOS: 1 days | End: 2022-01-21
Payer: COMMERCIAL

## 2022-01-21 DIAGNOSIS — Z98.890 OTHER SPECIFIED POSTPROCEDURAL STATES: Chronic | ICD-10-CM

## 2022-01-21 DIAGNOSIS — C50.912 MALIGNANT NEOPLASM OF UNSPECIFIED SITE OF LEFT FEMALE BREAST: ICD-10-CM

## 2022-01-21 PROCEDURE — C8937: CPT

## 2022-01-21 PROCEDURE — 77049 MRI BREAST C-+ W/CAD BI: CPT | Mod: 26

## 2022-01-21 PROCEDURE — C8908: CPT

## 2022-01-26 ENCOUNTER — APPOINTMENT (OUTPATIENT)
Dept: NUCLEAR MEDICINE | Facility: IMAGING CENTER | Age: 67
End: 2022-01-26
Payer: COMMERCIAL

## 2022-01-26 ENCOUNTER — RESULT REVIEW (OUTPATIENT)
Age: 67
End: 2022-01-26

## 2022-01-26 ENCOUNTER — APPOINTMENT (OUTPATIENT)
Dept: RADIOLOGY | Facility: IMAGING CENTER | Age: 67
End: 2022-01-26
Payer: COMMERCIAL

## 2022-01-26 ENCOUNTER — APPOINTMENT (OUTPATIENT)
Dept: ULTRASOUND IMAGING | Facility: IMAGING CENTER | Age: 67
End: 2022-01-26
Payer: COMMERCIAL

## 2022-01-26 ENCOUNTER — OUTPATIENT (OUTPATIENT)
Dept: OUTPATIENT SERVICES | Facility: HOSPITAL | Age: 67
LOS: 1 days | End: 2022-01-26
Payer: COMMERCIAL

## 2022-01-26 DIAGNOSIS — C50.912 MALIGNANT NEOPLASM OF UNSPECIFIED SITE OF LEFT FEMALE BREAST: ICD-10-CM

## 2022-01-26 DIAGNOSIS — Z98.890 OTHER SPECIFIED POSTPROCEDURAL STATES: Chronic | ICD-10-CM

## 2022-01-26 PROCEDURE — 78306 BONE IMAGING WHOLE BODY: CPT | Mod: 26

## 2022-01-26 PROCEDURE — 78830 RP LOCLZJ TUM SPECT W/CT 1: CPT | Mod: 26

## 2022-01-26 PROCEDURE — 71046 X-RAY EXAM CHEST 2 VIEWS: CPT | Mod: 26

## 2022-01-26 PROCEDURE — 76856 US EXAM PELVIC COMPLETE: CPT | Mod: 26

## 2022-01-26 PROCEDURE — 76830 TRANSVAGINAL US NON-OB: CPT | Mod: 26

## 2022-01-26 PROCEDURE — 76830 TRANSVAGINAL US NON-OB: CPT

## 2022-01-26 PROCEDURE — A9561: CPT

## 2022-01-26 PROCEDURE — 78830 RP LOCLZJ TUM SPECT W/CT 1: CPT

## 2022-01-26 PROCEDURE — 76700 US EXAM ABDOM COMPLETE: CPT | Mod: 26

## 2022-01-26 PROCEDURE — 71046 X-RAY EXAM CHEST 2 VIEWS: CPT

## 2022-01-26 PROCEDURE — 78306 BONE IMAGING WHOLE BODY: CPT

## 2022-01-26 PROCEDURE — 76856 US EXAM PELVIC COMPLETE: CPT

## 2022-01-26 PROCEDURE — 76700 US EXAM ABDOM COMPLETE: CPT

## 2022-02-01 ENCOUNTER — APPOINTMENT (OUTPATIENT)
Dept: PLASTIC SURGERY | Facility: CLINIC | Age: 67
End: 2022-02-01
Payer: COMMERCIAL

## 2022-02-01 VITALS
BODY MASS INDEX: 38.24 KG/M2 | HEART RATE: 73 BPM | DIASTOLIC BLOOD PRESSURE: 81 MMHG | SYSTOLIC BLOOD PRESSURE: 150 MMHG | TEMPERATURE: 71.7 F | HEIGHT: 64 IN | OXYGEN SATURATION: 95 % | WEIGHT: 224 LBS

## 2022-02-01 PROCEDURE — 99204 OFFICE O/P NEW MOD 45 MIN: CPT

## 2022-02-01 NOTE — CONSULT LETTER
[Dear  ___] : Dear  [unfilled], [Consult Letter:] : I had the pleasure of evaluating your patient, [unfilled]. [Please see my note below.] : Please see my note below. [Consult Closing:] : Thank you very much for allowing me to participate in the care of this patient.  If you have any questions, please do not hesitate to contact me. [Sincerely,] : Sincerely, [FreeTextEntry3] : Oleg Sesay MD, FACS

## 2022-02-07 ENCOUNTER — OUTPATIENT (OUTPATIENT)
Dept: OUTPATIENT SERVICES | Facility: HOSPITAL | Age: 67
LOS: 1 days | End: 2022-02-07
Payer: COMMERCIAL

## 2022-02-07 VITALS
HEART RATE: 85 BPM | HEIGHT: 64 IN | WEIGHT: 220.46 LBS | OXYGEN SATURATION: 97 % | DIASTOLIC BLOOD PRESSURE: 80 MMHG | SYSTOLIC BLOOD PRESSURE: 142 MMHG | TEMPERATURE: 98 F | RESPIRATION RATE: 16 BRPM

## 2022-02-07 DIAGNOSIS — Z98.890 OTHER SPECIFIED POSTPROCEDURAL STATES: Chronic | ICD-10-CM

## 2022-02-07 DIAGNOSIS — C50.912 MALIGNANT NEOPLASM OF UNSPECIFIED SITE OF LEFT FEMALE BREAST: ICD-10-CM

## 2022-02-07 DIAGNOSIS — Z96.642 PRESENCE OF LEFT ARTIFICIAL HIP JOINT: Chronic | ICD-10-CM

## 2022-02-07 DIAGNOSIS — G47.33 OBSTRUCTIVE SLEEP APNEA (ADULT) (PEDIATRIC): ICD-10-CM

## 2022-02-07 DIAGNOSIS — G47.00 INSOMNIA, UNSPECIFIED: ICD-10-CM

## 2022-02-07 LAB
ANION GAP SERPL CALC-SCNC: 10 MMOL/L — SIGNIFICANT CHANGE UP (ref 5–17)
BUN SERPL-MCNC: 16 MG/DL — SIGNIFICANT CHANGE UP (ref 7–23)
CALCIUM SERPL-MCNC: 9.6 MG/DL — SIGNIFICANT CHANGE UP (ref 8.4–10.5)
CHLORIDE SERPL-SCNC: 101 MMOL/L — SIGNIFICANT CHANGE UP (ref 96–108)
CO2 SERPL-SCNC: 29 MMOL/L — SIGNIFICANT CHANGE UP (ref 22–31)
CREAT SERPL-MCNC: 0.82 MG/DL — SIGNIFICANT CHANGE UP (ref 0.5–1.3)
GLUCOSE SERPL-MCNC: 95 MG/DL — SIGNIFICANT CHANGE UP (ref 70–99)
HCT VFR BLD CALC: 49.1 % — HIGH (ref 34.5–45)
HGB BLD-MCNC: 16.3 G/DL — HIGH (ref 11.5–15.5)
MCHC RBC-ENTMCNC: 33.2 GM/DL — SIGNIFICANT CHANGE UP (ref 32–36)
MCHC RBC-ENTMCNC: 34.2 PG — HIGH (ref 27–34)
MCV RBC AUTO: 102.9 FL — HIGH (ref 80–100)
NRBC # BLD: 0 /100 WBCS — SIGNIFICANT CHANGE UP (ref 0–0)
PLATELET # BLD AUTO: 319 K/UL — SIGNIFICANT CHANGE UP (ref 150–400)
POTASSIUM SERPL-MCNC: 3.9 MMOL/L — SIGNIFICANT CHANGE UP (ref 3.5–5.3)
POTASSIUM SERPL-SCNC: 3.9 MMOL/L — SIGNIFICANT CHANGE UP (ref 3.5–5.3)
RBC # BLD: 4.77 M/UL — SIGNIFICANT CHANGE UP (ref 3.8–5.2)
RBC # FLD: 12.4 % — SIGNIFICANT CHANGE UP (ref 10.3–14.5)
SODIUM SERPL-SCNC: 140 MMOL/L — SIGNIFICANT CHANGE UP (ref 135–145)
WBC # BLD: 9.09 K/UL — SIGNIFICANT CHANGE UP (ref 3.8–10.5)
WBC # FLD AUTO: 9.09 K/UL — SIGNIFICANT CHANGE UP (ref 3.8–10.5)

## 2022-02-07 RX ORDER — SOMATROPIN 10 MG
0.35 KIT SUBCUTANEOUS
Qty: 0 | Refills: 0 | DISCHARGE

## 2022-02-07 NOTE — H&P PST ADULT - FALL HARM RISK - UNIVERSAL INTERVENTIONS
Bed in lowest position, wheels locked, appropriate side rails in place/Call bell, personal items and telephone in reach/Instruct patient to call for assistance before getting out of bed or chair/Non-slip footwear when patient is out of bed/[place] to call system/Physically safe environment - no spills, clutter or unnecessary equipment/Purposeful Proactive Rounding/Room/bathroom lighting operational, light cord in reach

## 2022-02-07 NOTE — H&P PST ADULT - NSICDXPASTSURGICALHX_GEN_ALL_CORE_FT
PAST SURGICAL HISTORY:  S/P bilateral breast reduction 2005    S/P dilatation and curettage 2014, 2017    S/P hip replacement, left 11/2021

## 2022-02-07 NOTE — H&P PST ADULT - ATTENDING COMMENTS
66-year-old lady with left breast cancer (10:00) scheduled for excision, with oncoplastic closure (Dr. Oleg Sesay), and axillary sentinel lymphadenectomy.    Discussed with her prior to surgery, and again the day of operation.    All questions answered, consent on chart

## 2022-02-07 NOTE — H&P PST ADULT - NSICDXPASTMEDICALHX_GEN_ALL_CORE_FT
PAST MEDICAL HISTORY:  Arthritis left hip    Breast cancer left dx 1/2022    Growth hormone deficiency (human)     Insomnia     Obese     TIA (obstructive sleep apnea) deneis CPAP use    Polyp of corpus uteri

## 2022-02-07 NOTE — H&P PST ADULT - PROBLEM SELECTOR PLAN 1
Pre-op instructions given. Pt verbalized understanding  Chlorhexidine wash  instructions given  Pending: M/C   Pending: Covidpcr test/results

## 2022-02-07 NOTE — H&P PST ADULT - HEIGHT IN FEET
Patient : Criselda Ruvalcaba Age: 65 year old Sex: female   MRN: [id number] Encounter Date: 4/25/2020      History     Chief Complaint   Patient presents with   • Chest Pain Adult   • Cough     Joana Ruvalcaba is a 65-year-old female sent from the urgent care for evaluation of chest pain.  Patient describes some minimal chest tightness which has been intermittently present for the last several days only with breathing, and intermittently.  There is no significant pain, no shortness of breath, no fever, no myalgias, no nausea, no vomiting, no diarrhea, no dysuria, no leg swelling, no leg tenderness.  She has no chest discomfort or shortness breath with exertion.  She states that the symptoms feel “like when I am getting a case of bronchitis or walking pneumonia”.  I have beginning yesterday, she did have a mild cough as well.    Allergies   Allergen Reactions   • Epinephrine Palpitations     Rapid heartbeat, hyperventilation, shaking. (This is an adverse effect not a true allergy)       Current Discharge Medication List      Prior to Admission Medications    Details   atorvastatin (LIPITOR) 10 MG tablet Take 1 tablet by mouth daily.  Qty: 90 tablet, Refills: 1      cephalexin (KEFLEX) 500 MG capsule Take 1 capsule by mouth 4 times daily.  Qty: 28 capsule, Refills: 0             Current Discharge Medication List          Past Medical History:   Diagnosis Date   • Anxiety    • Arthritis    • Hemangioma of liver    • Hyperlipidemia    • Ovarian cyst    • Prolapse of mitral valve AGE 33       Past Surgical History:   Procedure Laterality Date   • COLONOSCOPY  12/14/2015    5 years    • COLONOSCOPY DIAGNOSTIC  8/28/2009    hx polyps;repeat 5 yrs;08/2014   • DILATION AND CURETTAGE OF UTERUS  2009 [facility]   • PAP SMEAR  8/26/2010   • SCR MAMMO PRODUC DIR DIGTL IMAG BELÉN  6/7/2010       Family History   Problem Relation Age of Onset   • Cancer Mother         ovarian   • Heart disease Father    • Thyroid Father        Social History      Tobacco Use   • Smoking status: Never Smoker   • Smokeless tobacco: Never Used   Substance Use Topics   • Alcohol use: No     Alcohol/week: 0.0 standard drinks   • Drug use: No       Review of Systems   Constitutional: Negative for fever.   HENT: Negative for congestion and sore throat.    Eyes: Negative for visual disturbance.   Respiratory: Positive for cough and chest tightness ( Minimal). Negative for shortness of breath.    Cardiovascular: Negative for chest pain.   Gastrointestinal: Negative for diarrhea, nausea and vomiting.   Genitourinary: Negative for dysuria.   Musculoskeletal: Negative for back pain.   Skin: Negative for rash.   Neurological: Negative for dizziness and headaches.   Hematological: Does not bruise/bleed easily.   Psychiatric/Behavioral: Negative.  Negative for agitation.       Physical Exam     ED Triage Vitals [04/25/20 0924]   ED Triage Vitals Group      Temp 97.7 °F (36.5 °C)      Heart Rate 61      Resp 18      /79      SpO2 98 %      EtCO2 mmHg       Height       Weight       Weight Scale Used       BMI (Calculated)       IBW/kg (Calculated)        Physical Exam   Constitutional: She is oriented to person, place, and time. She appears well-nourished. No distress.   HENT:   Head: Normocephalic and atraumatic.   Nose: Nose normal.   Eyes: Conjunctivae and EOM are normal.   Neck: Normal range of motion. Neck supple. No tracheal deviation present.   Cardiovascular: Normal rate and regular rhythm.   No murmur heard.  Pulmonary/Chest: Effort normal and breath sounds normal.   Abdominal: Soft. There is no abdominal tenderness.   Musculoskeletal:         General: No edema.   Neurological: She is alert and oriented to person, place, and time.   GCS 15   Skin: Skin is warm and dry.   Psychiatric: She has a normal mood and affect.   Nursing note and vitals reviewed.      ED Course     Procedures    Lab Results     No results found for this visit on 04/25/20.    EKG Results     EKG  Interpretation  Rate: 60  Rhythm: normal sinus rhythm   Abnormality:  Normal EKG, no change when compared with April 7, 2018    EKG tracing interpreted by ED physician    Radiology Results     Imaging Results          XR CHEST AP OR PA - PORTABLE (In process)                 ED Medication Orders (From admission, onward)    None               MDM  Vitals  Vitals:    04/25/20 0924   BP: 129/79   Pulse: 61   Resp: 18   Temp: 97.7 °F (36.5 °C)   TempSrc: Oral   SpO2: 98%       ED Course  Intial Impression:  Otherwise healthy 65-year-old female with mild respiratory symptoms.  She has no chest discomfort or shortness of breath with exertion.  Her only cardiac risk factor is hyperlipidemia in addition to her age.  She has no pleuritic pain or symptoms, and admits that this feels “like when I have a cold”.  Given her age will obtain EKG to evaluate for any abnormality as I cannot reviewed 1 that was done urgent care.  Chest x-ray will assess for pneumonia, pneumothorax, infiltrate or other pathology.  Given normal vital signs normal exam, and atypical symptoms, I do not feel labs are needed at this time.      10:22 AM recheck.  Patient is resting comfortably in bed, she has no symptoms at this time.  We discussed the results of her EKG, which is normal and very reassuring.  We will proceed with plan for chest x-ray to evaluate for other pathology.    Care of patient is signed over to my esteemed colleague, Dr. Hawthorne, will follow-up on chest x-ray and assist with disposition.  Given patient has minimal symptoms, no symptoms with exertion, minimal cardiac risk factors, anticipate she will be able to go home.  If chest x-ray is abnormal, COVID testing might be considered.    [facility]  Critical Care time spent on this patient outside of billable procedures:  None        ____________________________________________________________________      Dr. Miky Soto MD  Dictation # [id number]           Miky Soto,  MD  04/27/20 1425     5

## 2022-02-07 NOTE — H&P PST ADULT - HISTORY OF PRESENT ILLNESS
67yo female with medical h/o TIA denies CPAP use, Obesity on Saxenda for weight loss and Insomnia. Pt reports h/o annual mammogram 1/2022 indicate Left breast cancer and presents today for PST for scheduled Excision Left Breast Cancer, Left Axillary [place] Lymph Node Biopsy on 2/16/2022

## 2022-02-09 ENCOUNTER — NON-APPOINTMENT (OUTPATIENT)
Age: 67
End: 2022-02-09

## 2022-02-11 ENCOUNTER — RESULT REVIEW (OUTPATIENT)
Age: 67
End: 2022-02-11

## 2022-02-11 PROCEDURE — 85027 COMPLETE CBC AUTOMATED: CPT

## 2022-02-11 PROCEDURE — 80048 BASIC METABOLIC PNL TOTAL CA: CPT

## 2022-02-11 PROCEDURE — G0463: CPT

## 2022-02-11 PROCEDURE — 36415 COLL VENOUS BLD VENIPUNCTURE: CPT

## 2022-02-14 LAB — SURGICAL PATHOLOGY STUDY: SIGNIFICANT CHANGE UP

## 2022-02-15 ENCOUNTER — TRANSCRIPTION ENCOUNTER (OUTPATIENT)
Age: 67
End: 2022-02-15

## 2022-02-15 NOTE — ASU DISCHARGE PLAN (ADULT/PEDIATRIC) - ACTIVITY LEVEL
No excercise/No heavy lifting/No sports/gym/Elevate extremity please do not sleep on your sides, please sleep on your back./No excercise/No heavy lifting/No sports/gym/Elevate extremity

## 2022-02-15 NOTE — ASU DISCHARGE PLAN (ADULT/PEDIATRIC) - PROVIDER TOKENS
PROVIDER:[TOKEN:[[id number]],SCHEDULEDAPPT:[02/22/2022],SCHEDULEDAPPTTIME:[09:15 AM],ESTABLISHEDPATIENT:[T]]

## 2022-02-15 NOTE — ASU DISCHARGE PLAN (ADULT/PEDIATRIC) - PAIN MANAGEMENT
Your medications (Pain medication) was sent to your pharmacy before your surgery over the weekend.,/Prescriptions electronically submitted to pharmacy from doctor's office

## 2022-02-15 NOTE — ASU DISCHARGE PLAN (ADULT/PEDIATRIC) - NS MD DC FALL RISK RISK
For information on Fall & Injury Prevention, visit: [email or web address] OR  [email or web address] OR  https://www.cdc.gov/steadi/patient.html

## 2022-02-15 NOTE — ASU DISCHARGE PLAN (ADULT/PEDIATRIC) - ASU DC SPECIAL INSTRUCTIONSFT
Initial followup with plastic surgery in the next 5-15 days, regarding matters of bandages, activities, and showering.    Dr. Gonzalez should call with pathology report in approximately 2 weeks.  The conversation will determine further management. 1. Please follow up with Dr. Sesay's PA, Hilary, next week TUESDAY for your first post operative visit. Your appointment has already been made. Please call the office if you need to make any changes to your appointment at [phone] (during normal business hours M-F 9-5pm).     Dr. Gonzalez will call with pathology results in approximately 2 weeks, the conversation will determine further management and follow up.    2. Activity:   Please avoid any strenuous activities, AVOID bending, stretching, reaching overhead, or any heavy lifting.    3. Dressings:   Some OOZING and blood on the dressing is normal and to be expected. If it becomes saturated w/ BRIGHT red blood that does not stop, please call [phone] for email HILARY: [email or web address]    4. Medications:  Your medications were sent to your pharmacy.  Please take the prescribed medications as directed.   For MILD pain please take regular over the counter pain medications such as: Advil, Tylenol, Motrin.   Only take the narcotic prescribed pain medication for SEVERE PAIN.   If constipation occurs after taking narcotic pain medications, please take an over the counter stool softener. 1. Please follow up with Dr. Sesay's PA, Hilary, next week TUESDAY for your first post operative visit. Your appointment has already been made. Please call the office if you need to make any changes to your appointment at [phone] (during normal business hours M-F 9-5pm).     Dr. Gonzalez will call with pathology results in approximately 2 weeks, the conversation will determine further management and follow up.    2. Activity:   Please avoid any strenuous activities, AVOID bending, stretching, reaching overhead, or any heavy lifting.  Please avoid sleeping on your sides, only on your back.    3. Dressings:   Some OOZING and blood on the dressing is normal and to be expected. If it becomes saturated w/ BRIGHT red blood that does not stop, please call [phone] for email HILARY: [email or web address]    You can get both areas wet, take quick showers to minimize time without a bra.  Please ice the left breast to reduce swelling.  4. Medications:  Your medications were sent to your pharmacy.  Please take the prescribed medications as directed.   For MILD pain please take regular over the counter pain medications such as: Advil, Tylenol, Motrin.   Only take the narcotic prescribed pain medication for SEVERE PAIN.   If constipation occurs after taking narcotic pain medications, please take an over the counter stool softener.

## 2022-02-15 NOTE — ASU DISCHARGE PLAN (ADULT/PEDIATRIC) - COMMENTS
Dr. Sesay's  direct phone number is [phone]. If after office hours (9-5PM M-F), then please wait until normal business hours to call.   You may leave a detailed VM as well. You may also email [email or web address] for any concerns or questions regarding scheduling appointments.  You will see Dr. Sesay's ISABELA Richard, (Jocelin MAR) for your post operative visit.  You may also contact her directly via email: [email or web address] for any concerns or questions.

## 2022-02-15 NOTE — ASU DISCHARGE PLAN (ADULT/PEDIATRIC) - BATHING
you can take a quick shower after 3 days. Please keep the showers short- you need to minimize the time without a bra./Shower only/Do not submerge in water

## 2022-02-15 NOTE — ASU DISCHARGE PLAN (ADULT/PEDIATRIC) - PROCEDURE
Excision of left breast cancer, lymph node biopsy, plastics closures Excision of left breast cancer, lymph node biopsy, oncoplastic closures

## 2022-02-15 NOTE — ASU DISCHARGE PLAN (ADULT/PEDIATRIC) - CARE PROVIDER_API CALL
Jocelin Miguel)  Physician Assistant Services  [address], [address]  [place] [address]  Phone: [phone]  Fax: [phone]  Established Patient  Scheduled Appointment: 02/22/2022 09:15 AM

## 2022-02-16 ENCOUNTER — APPOINTMENT (OUTPATIENT)
Dept: SURGICAL ONCOLOGY | Facility: HOSPITAL | Age: 67
End: 2022-02-16

## 2022-02-16 ENCOUNTER — OUTPATIENT (OUTPATIENT)
Dept: OUTPATIENT SERVICES | Facility: HOSPITAL | Age: 67
LOS: 1 days | End: 2022-02-16
Payer: COMMERCIAL

## 2022-02-16 ENCOUNTER — RESULT REVIEW (OUTPATIENT)
Age: 67
End: 2022-02-16

## 2022-02-16 ENCOUNTER — APPOINTMENT (OUTPATIENT)
Dept: PLASTIC SURGERY | Facility: HOSPITAL | Age: 67
End: 2022-02-16

## 2022-02-16 VITALS
TEMPERATURE: 99 F | DIASTOLIC BLOOD PRESSURE: 68 MMHG | OXYGEN SATURATION: 98 % | RESPIRATION RATE: 16 BRPM | SYSTOLIC BLOOD PRESSURE: 130 MMHG | HEART RATE: 74 BPM

## 2022-02-16 VITALS
HEART RATE: 67 BPM | OXYGEN SATURATION: 95 % | HEIGHT: 64 IN | SYSTOLIC BLOOD PRESSURE: 127 MMHG | TEMPERATURE: 99 F | DIASTOLIC BLOOD PRESSURE: 76 MMHG | WEIGHT: 220.46 LBS | RESPIRATION RATE: 16 BRPM

## 2022-02-16 DIAGNOSIS — Z98.890 OTHER SPECIFIED POSTPROCEDURAL STATES: Chronic | ICD-10-CM

## 2022-02-16 DIAGNOSIS — C50.912 MALIGNANT NEOPLASM OF UNSPECIFIED SITE OF LEFT FEMALE BREAST: ICD-10-CM

## 2022-02-16 DIAGNOSIS — Z96.642 PRESENCE OF LEFT ARTIFICIAL HIP JOINT: Chronic | ICD-10-CM

## 2022-02-16 PROCEDURE — 13101 CMPLX RPR TRUNK 2.6-7.5 CM: CPT

## 2022-02-16 PROCEDURE — 13101 CMPLX RPR TRUNK 2.6-7.5 CM: CPT | Mod: AS

## 2022-02-16 PROCEDURE — 19125 EXCISION BREAST LESION: CPT | Mod: LT

## 2022-02-16 PROCEDURE — 88341 IMHCHEM/IMCYTCHM EA ADD ANTB: CPT

## 2022-02-16 PROCEDURE — 13102 CMPLX RPR TRUNK ADDL 5CM/<: CPT

## 2022-02-16 PROCEDURE — 13102 CMPLX RPR TRUNK ADDL 5CM/<: CPT | Mod: AS

## 2022-02-16 PROCEDURE — 88307 TISSUE EXAM BY PATHOLOGIST: CPT

## 2022-02-16 PROCEDURE — 38525 BIOPSY/REMOVAL LYMPH NODES: CPT | Mod: LT

## 2022-02-16 PROCEDURE — 88341 IMHCHEM/IMCYTCHM EA ADD ANTB: CPT | Mod: 26

## 2022-02-16 PROCEDURE — C1889: CPT

## 2022-02-16 PROCEDURE — 19281 PERQ DEVICE BREAST 1ST IMAG: CPT

## 2022-02-16 PROCEDURE — 88307 TISSUE EXAM BY PATHOLOGIST: CPT | Mod: 26

## 2022-02-16 PROCEDURE — 38740 REMOVE ARMPIT LYMPH NODES: CPT | Mod: LT

## 2022-02-16 PROCEDURE — 76098 X-RAY EXAM SURGICAL SPECIMEN: CPT

## 2022-02-16 PROCEDURE — 38792 RA TRACER ID OF SENTINL NODE: CPT

## 2022-02-16 PROCEDURE — 19281 PERQ DEVICE BREAST 1ST IMAG: CPT | Mod: LT

## 2022-02-16 PROCEDURE — 19499 UNLISTED PROCEDURE BREAST: CPT | Mod: LT

## 2022-02-16 PROCEDURE — 19499 UNLISTED PROCEDURE BREAST: CPT | Mod: AS,LT

## 2022-02-16 PROCEDURE — 88342 IMHCHEM/IMCYTCHM 1ST ANTB: CPT | Mod: 26

## 2022-02-16 PROCEDURE — 76098 X-RAY EXAM SURGICAL SPECIMEN: CPT | Mod: 26

## 2022-02-16 PROCEDURE — A9541: CPT

## 2022-02-16 DEVICE — HEMOSTAT ARISTA 3GR: Type: IMPLANTABLE DEVICE | Status: FUNCTIONAL

## 2022-02-16 RX ORDER — HEPARIN SODIUM 5000 [USP'U]/ML
5000 INJECTION INTRAVENOUS; SUBCUTANEOUS ONCE
Refills: 0 | Status: COMPLETED | OUTPATIENT
Start: 2022-02-16 | End: 2022-02-16

## 2022-02-16 RX ORDER — HYDROMORPHONE HYDROCHLORIDE 2 MG/ML
0.5 INJECTION INTRAMUSCULAR; INTRAVENOUS; SUBCUTANEOUS
Refills: 0 | Status: DISCONTINUED | OUTPATIENT
Start: 2022-02-16 | End: 2022-02-16

## 2022-02-16 RX ORDER — SODIUM CHLORIDE 9 MG/ML
1000 INJECTION, SOLUTION INTRAVENOUS
Refills: 0 | Status: DISCONTINUED | OUTPATIENT
Start: 2022-02-16 | End: 2022-02-16

## 2022-02-16 RX ORDER — ONDANSETRON 8 MG/1
4 TABLET, FILM COATED ORAL ONCE
Refills: 0 | Status: DISCONTINUED | OUTPATIENT
Start: 2022-02-16 | End: 2022-03-02

## 2022-02-16 RX ORDER — OXYCODONE HYDROCHLORIDE 5 MG/1
5 TABLET ORAL ONCE
Refills: 0 | Status: DISCONTINUED | OUTPATIENT
Start: 2022-02-16 | End: 2022-02-16

## 2022-02-16 RX ORDER — HYDROMORPHONE HYDROCHLORIDE 2 MG/ML
1 INJECTION INTRAMUSCULAR; INTRAVENOUS; SUBCUTANEOUS
Refills: 0 | Status: DISCONTINUED | OUTPATIENT
Start: 2022-02-16 | End: 2022-02-16

## 2022-02-16 RX ORDER — ONDANSETRON 8 MG/1
4 TABLET, FILM COATED ORAL ONCE
Refills: 0 | Status: DISCONTINUED | OUTPATIENT
Start: 2022-02-16 | End: 2022-02-16

## 2022-02-16 RX ADMIN — HEPARIN SODIUM 5000 UNIT(S): 5000 INJECTION INTRAVENOUS; SUBCUTANEOUS at 12:41

## 2022-02-16 NOTE — PRE-ANESTHESIA EVALUATION ADULT - NSANTHADDINFOFT_GEN_ALL_CORE
Discussed GA with LMA in detail with patient and all questions sought and answered. Pt. agrees to all plans and wishes to proceed with surgical care.    Cardiac/Medical evaluation/optimization and clearance noted and appreciated.

## 2022-02-16 NOTE — ASU PATIENT PROFILE, ADULT - PRO INTERPRETER NEED 2
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 Pt now striving to improve BG  A1c 10s -> in '21 doing Keto diet / Insulin - OS worse HMG **(DID NOT CLEAR in Summer '21 -- DONE w surgery - Sep '21 Khadijah VIT OS w PRP laser)]] TODAY postop IMPROVED>  Air gone. Few cysts post op CME/DME
      RTC RETINA check colors / OCT pos. PLAN TO ADD AVASTIN OU THEREAFTER SUSPEND injx OS . . . Do a couple more OD then plan FLTx OD Laser [place] AUDREY Arguello for pt. Urged efforts - she is striving w Keto / BG -- A1c IMPROVED 10's--> 8.2 and lost 16lb in Summer '21!!  Encouraged pt efforts! English

## 2022-02-16 NOTE — ASU PATIENT PROFILE, ADULT - FALL HARM RISK - UNIVERSAL INTERVENTIONS
Bed in lowest position, wheels locked, appropriate side rails in place/Call bell, personal items and telephone in reach/Instruct patient to call for assistance before getting out of bed or chair/Non-slip footwear when patient is out of bed/[place] to call system/Physically safe environment - no spills, clutter or unnecessary equipment/Purposeful Proactive Rounding/Room/bathroom lighting operational, light cord in reach

## 2022-02-16 NOTE — ASU PATIENT PROFILE, ADULT - NSICDXPASTSURGICALHX_GEN_ALL_CORE_FT
PAST SURGICAL HISTORY:  S/P bilateral breast reduction 2005    S/P dilatation and curettage 2014, 2017    S/P hip replacement, left 11/2021     PAST SURGICAL HISTORY:  S/P bilateral breast reduction 2005    S/P dilatation and curettage 2021, 2017    S/P hip replacement, left 11/2021

## 2022-02-16 NOTE — BRIEF OPERATIVE NOTE - OPERATION/FINDINGS
Wire localization of left breast cancer with sentinel lymphadenectomy and plastics closure
patient s/p left breast lumpectomy for breast cancer, with sentinel lymph node biopsy.  I was present for the entire length of the case, there was no other qualified resident to assist  I assisted with hemostasis, exposure, creating of flaps, closure of wound, and placement of dressings.

## 2022-02-17 ENCOUNTER — NON-APPOINTMENT (OUTPATIENT)
Age: 67
End: 2022-02-17

## 2022-02-22 ENCOUNTER — APPOINTMENT (OUTPATIENT)
Dept: PLASTIC SURGERY | Facility: CLINIC | Age: 67
End: 2022-02-22
Payer: COMMERCIAL

## 2022-02-22 VITALS
WEIGHT: 221 LBS | TEMPERATURE: 97.8 F | OXYGEN SATURATION: 98 % | HEART RATE: 73 BPM | HEIGHT: 64 IN | SYSTOLIC BLOOD PRESSURE: 169 MMHG | DIASTOLIC BLOOD PRESSURE: 89 MMHG | BODY MASS INDEX: 37.73 KG/M2

## 2022-02-22 DIAGNOSIS — Z98.890 OTHER SPECIFIED POSTPROCEDURAL STATES: ICD-10-CM

## 2022-02-22 PROBLEM — G47.00 INSOMNIA, UNSPECIFIED: Chronic | Status: ACTIVE | Noted: 2022-02-07

## 2022-02-22 PROBLEM — C50.919 MALIGNANT NEOPLASM OF UNSPECIFIED SITE OF UNSPECIFIED FEMALE BREAST: Chronic | Status: ACTIVE | Noted: 2022-02-07

## 2022-02-22 PROBLEM — G47.33 OBSTRUCTIVE SLEEP APNEA (ADULT) (PEDIATRIC): Chronic | Status: ACTIVE | Noted: 2022-02-07

## 2022-02-22 PROCEDURE — 99024 POSTOP FOLLOW-UP VISIT: CPT

## 2022-02-25 LAB — SURGICAL PATHOLOGY STUDY: SIGNIFICANT CHANGE UP

## 2022-02-28 ENCOUNTER — NON-APPOINTMENT (OUTPATIENT)
Age: 67
End: 2022-02-28

## 2022-03-02 ENCOUNTER — NON-APPOINTMENT (OUTPATIENT)
Age: 67
End: 2022-03-02

## 2022-03-02 ENCOUNTER — APPOINTMENT (OUTPATIENT)
Dept: INTERNAL MEDICINE | Facility: CLINIC | Age: 67
End: 2022-03-02
Payer: COMMERCIAL

## 2022-03-02 PROCEDURE — 99214 OFFICE O/P EST MOD 30 MIN: CPT

## 2022-03-02 NOTE — REASON FOR VISIT
[Initial Consultation] : an initial consultation for [Other: _____] : [unfilled] [FreeTextEntry2] : Recently diagnosed left breast (10:00) cancer

## 2022-03-02 NOTE — HISTORY OF PRESENT ILLNESS
[de-identified] : 66-year-old lady referred by her gynecologist Dr. Virginia TENA with a recent diagnosis of a LEFT BREAST CANCER (7:00).\par + Infiltrating ductal carcinoma.\par + Associated DCIS and intermediate grade atypia.\par Receptor studies are pending.................. \par \par \par This is an asymptomatic nonpalpable area identified on breast imaging.\par \par \par + Prior personal history:\par ~2005: Bilateral breast reduction.\par She does not recall the physician's name, or additional details.\par No other prior procedures related to either breast.\par \par No previous personal history of malignancy.\par \par \par No relatives with breast cancer.\par No relatives with ovarian cancer.\par \par Not Ashkenazi.\par \par No relatives with a history of malignancy.\par \par Her menarche was at 13.\par  2, para 2, first at 19.\par Natural menopause at 51.\par + HRT till age 61.\par \par \par Sequence of diagnostic imaging.\par 2022:\par Left mammogram and sonogram performed as a 6-month follow-up to BI-RADS 3 imaging.\par Mammography:\par 1 x 0.5 cm mass in the left breast (UIQ), with spiculated margins.\par Sonogram:\par 7 x 7 x 5 mm deep shadowing hypoechoic mass with a slightly more anterior 5 x 4 x 3 mm nodule, the combination likely corresponding to the mammographic finding.\par \par 1/10/2022:\par Left breast sonogram guided core biopsy provided the above diagnosis..\par \par \par PMD: Dr. Eduardo REDMAN.\par \par No pacemaker or defibrillator.\par No anticoagulants.\par \par + Human growth hormone deficiency, since age 51.\par She takes daily injections of Norditropin.\par Endocrinology: Dr. Konstantin DESHPANDE.\par \par + Osteoarthritis.\par She takes daily meloxicam.\par  she had a left total hip replacement at [facility]".\par \par Her only other medication is Saxenda for weight loss.\par This is prescribed by her internist.\par \par \par GYN: Dr. Virginia TENA.\par \par \par Colonoscopy was in [place] in 2019 at [facility], Dr. Julian MARX

## 2022-03-02 NOTE — ASSESSMENT
[FreeTextEntry1] : We had a discussion regarding her recently diagnosed left breast cancer.\par \par For her extent of disease evaluation I have suggested:\par Breast MRI.\par Chest x-ray.\par Sonogram of the abdomen.\par Ultrasound of the pelvis.\par Bone scan.\par She understands and agrees.\par Prescriptions entered.\par \par I introduced the topic of genetic testing, but she does not want to pursue this presently.\par \par We have discussed the options of breast conservation versus mastectomy, the former is her preference.\par Will be performed in conjunction with sentinel node biopsy.\par \par Paperwork for surgical scheduling submitted today.\par \par Referring physician contacted through secure email.\par \par \par \par 1/25/2022.\par We spoke.\par January 21, 2022, breast MRI at 450: BI-RADS 6.\par No malignancy spans approximately 1 cm.\par \par The remainder of her staging studies are scheduled for tomorrow, January 26.\par \par \par \par 1/26/2022:\par Additional imaging at 450:\par Chest x-ray: No acute pulmonary disease, no changes since June 2014.\par Bone scan: No evidence of metastatic disease.\par Abdominal sonogram: Hepatic steatosis, no evidence of metastatic disease.\par Pelvic sonogram at 450: Normal endometrium, ovaries not visualized.\par \par \par 1/28/2022.\par I called and reviewed the above information.\par Surgery date is still pending\par \par \par 3/2/2022.\par We spoke.\par February 16, 2022, she had left breast conserving surgery.\par Oncoplastic closure: Dr. Oleg Sesay.\par Surgical pathology:\par 1.  Primary tumor =1 cm.\par Negative margins.\par 2.  0/4 SLN's.\par 3.  + ER/TN.\par Oncotype DX requested today.\par \par I am arranging for visits with medical oncology and radiation therapy, nurse navigators contacted.\par \par I have contacted her referring physicians through secure email.\par \par My office will call to schedule a postoperative visit.\par \par She was feeling well the first week after surgery.\par Since then, she has had some easy fatigability, with dyspnea on exertion.\par No chest pain, nor pain with swelling in the lower extremities.\par I explained the concern regarding a possible DVT/pulmonary embolism and suggested that she go to the emergency room.\par She understands, but is unsure about how she will proceed.

## 2022-03-02 NOTE — REVIEW OF SYSTEMS
[Negative] : Integumentary [de-identified] : Osteoarthritis [de-identified] : Human growth hormone deficiency [FreeTextEntry1] : Breast cancer

## 2022-03-02 NOTE — PHYSICAL EXAM
[Normal] : supple, no neck mass and thyroid not enlarged [Normal Neck Lymph Nodes] : normal neck lymph nodes  [Normal Supraclavicular Lymph Nodes] : normal supraclavicular lymph nodes [Normal Axillary Lymph Nodes] : normal axillary lymph nodes [Normal] : normal appearance, no rash, nodules, vesicles, ulcers, erythema [de-identified] : Groins not examined [de-identified] : Below

## 2022-03-03 ENCOUNTER — OUTPATIENT (OUTPATIENT)
Dept: OUTPATIENT SERVICES | Facility: HOSPITAL | Age: 67
LOS: 1 days | End: 2022-03-03
Payer: COMMERCIAL

## 2022-03-03 ENCOUNTER — APPOINTMENT (OUTPATIENT)
Dept: CT IMAGING | Facility: IMAGING CENTER | Age: 67
End: 2022-03-03
Payer: COMMERCIAL

## 2022-03-03 ENCOUNTER — APPOINTMENT (OUTPATIENT)
Dept: PLASTIC SURGERY | Facility: CLINIC | Age: 67
End: 2022-03-03

## 2022-03-03 VITALS
HEART RATE: 74 BPM | DIASTOLIC BLOOD PRESSURE: 78 MMHG | OXYGEN SATURATION: 99 % | RESPIRATION RATE: 14 BRPM | SYSTOLIC BLOOD PRESSURE: 142 MMHG

## 2022-03-03 DIAGNOSIS — Z98.890 OTHER SPECIFIED POSTPROCEDURAL STATES: Chronic | ICD-10-CM

## 2022-03-03 DIAGNOSIS — Z96.642 PRESENCE OF LEFT ARTIFICIAL HIP JOINT: Chronic | ICD-10-CM

## 2022-03-03 DIAGNOSIS — R06.00 DYSPNEA, UNSPECIFIED: ICD-10-CM

## 2022-03-03 LAB
ANION GAP SERPL CALC-SCNC: 13 MMOL/L
BASOPHILS # BLD AUTO: 0.04 K/UL
BASOPHILS NFR BLD AUTO: 0.5 %
BUN SERPL-MCNC: 12 MG/DL
CALCIUM SERPL-MCNC: 9.7 MG/DL
CHLORIDE SERPL-SCNC: 103 MMOL/L
CO2 SERPL-SCNC: 25 MMOL/L
CREAT SERPL-MCNC: 0.75 MG/DL
EGFR: 88 ML/MIN/1.73M2
EOSINOPHIL # BLD AUTO: 0.18 K/UL
EOSINOPHIL NFR BLD AUTO: 2.1 %
GLUCOSE SERPL-MCNC: 118 MG/DL
HCT VFR BLD CALC: 46 %
HGB BLD-MCNC: 15.4 G/DL
IMM GRANULOCYTES NFR BLD AUTO: 0.5 %
LYMPHOCYTES # BLD AUTO: 2.57 K/UL
LYMPHOCYTES NFR BLD AUTO: 29.6 %
MAN DIFF?: NORMAL
MCHC RBC-ENTMCNC: 33.5 GM/DL
MCHC RBC-ENTMCNC: 34 PG
MCV RBC AUTO: 101.5 FL
MONOCYTES # BLD AUTO: 0.39 K/UL
MONOCYTES NFR BLD AUTO: 4.5 %
NEUTROPHILS # BLD AUTO: 5.47 K/UL
NEUTROPHILS NFR BLD AUTO: 62.8 %
PLATELET # BLD AUTO: 306 K/UL
POTASSIUM SERPL-SCNC: 3.9 MMOL/L
RBC # BLD: 4.53 M/UL
RBC # FLD: 12 %
SODIUM SERPL-SCNC: 141 MMOL/L
WBC # FLD AUTO: 8.69 K/UL

## 2022-03-03 PROCEDURE — 71275 CT ANGIOGRAPHY CHEST: CPT | Mod: 26

## 2022-03-03 PROCEDURE — 71275 CT ANGIOGRAPHY CHEST: CPT

## 2022-03-03 NOTE — PHYSICAL EXAM
[Normal] : soft, non-tender, non-distended, no masses palpated, no HSM and normal bowel sounds [de-identified] : extrasystoles [de-identified] : surgically bandaged

## 2022-03-03 NOTE — HISTORY OF PRESENT ILLNESS
[Shortness of Breath] : Shortness of Breath [Exercise Intolerance] : exercise intolerance [Fatigue] : fatigue [Lightheadedness] : no lightheadedness [Palpitations] : palpitations [Weakness] : no weakness [Orthopnea] : no orthopnea [Chest Pain] : no chest pain [Chest Tightness] : no chest tightness [Choking Sensation] : no choking sensation [Cough] : no cough [Wheezing] : no wheezing [Fever] : no fever [Hemoptysis] : no hemoptysis [Sore Throat] : no sore throat [Edema] : no edema

## 2022-03-03 NOTE — ASSESSMENT
[FreeTextEntry1] : ECG:  NSR; normal intervals and axis; no ST-T wave changes  PAC\par \par Wells score 5.5; moderate risk PE\par \par Refer for CTA\par \par 34 minutes spent in preparation of this visit, including review of previous notes and test results, interview and examination of patient, discussion of plan, arranging for appropriate testing and treatment, and documentation.\par \par

## 2022-03-04 ENCOUNTER — OUTPATIENT (OUTPATIENT)
Dept: OUTPATIENT SERVICES | Facility: HOSPITAL | Age: 67
LOS: 1 days | Discharge: HOME | End: 2022-03-04

## 2022-03-04 DIAGNOSIS — Z98.890 OTHER SPECIFIED POSTPROCEDURAL STATES: Chronic | ICD-10-CM

## 2022-03-04 DIAGNOSIS — C50.919 MALIGNANT NEOPLASM OF UNSPECIFIED SITE OF UNSPECIFIED FEMALE BREAST: ICD-10-CM

## 2022-03-04 DIAGNOSIS — Z11.59 ENCOUNTER FOR SCREENING FOR OTHER VIRAL DISEASES: ICD-10-CM

## 2022-03-04 DIAGNOSIS — Z96.642 PRESENCE OF LEFT ARTIFICIAL HIP JOINT: Chronic | ICD-10-CM

## 2022-03-07 ENCOUNTER — APPOINTMENT (OUTPATIENT)
Dept: HEMATOLOGY ONCOLOGY | Facility: CLINIC | Age: 67
End: 2022-03-07
Payer: COMMERCIAL

## 2022-03-07 ENCOUNTER — RESULT REVIEW (OUTPATIENT)
Age: 67
End: 2022-03-07

## 2022-03-07 VITALS
BODY MASS INDEX: 38.25 KG/M2 | RESPIRATION RATE: 16 BRPM | WEIGHT: 224.06 LBS | HEART RATE: 63 BPM | TEMPERATURE: 97.5 F | HEIGHT: 63.98 IN | OXYGEN SATURATION: 98 %

## 2022-03-07 PROCEDURE — 99205 OFFICE O/P NEW HI 60 MIN: CPT

## 2022-03-08 LAB
SARS-COV-2 N GENE NPH QL NAA+PROBE: NOT DETECTED
SURGICAL PATHOLOGY STUDY: SIGNIFICANT CHANGE UP

## 2022-03-10 ENCOUNTER — APPOINTMENT (OUTPATIENT)
Dept: PLASTIC SURGERY | Facility: CLINIC | Age: 67
End: 2022-03-10

## 2022-03-14 ENCOUNTER — OUTPATIENT (OUTPATIENT)
Dept: OUTPATIENT SERVICES | Facility: HOSPITAL | Age: 67
LOS: 1 days | Discharge: HOME | End: 2022-03-14
Payer: COMMERCIAL

## 2022-03-14 DIAGNOSIS — Z96.642 PRESENCE OF LEFT ARTIFICIAL HIP JOINT: Chronic | ICD-10-CM

## 2022-03-14 DIAGNOSIS — Z98.890 OTHER SPECIFIED POSTPROCEDURAL STATES: Chronic | ICD-10-CM

## 2022-03-15 RX ORDER — NYSTATIN 100000 [USP'U]/G
100000 CREAM TOPICAL TWICE DAILY
Qty: 1 | Refills: 0 | Status: DISCONTINUED | COMMUNITY
Start: 2021-06-21 | End: 2022-03-15

## 2022-03-15 RX ORDER — OXYCODONE AND ACETAMINOPHEN 5; 325 MG/1; MG/1
5-325 TABLET ORAL
Qty: 18 | Refills: 0 | Status: DISCONTINUED | COMMUNITY
Start: 2022-02-11 | End: 2022-03-15

## 2022-03-17 ENCOUNTER — APPOINTMENT (OUTPATIENT)
Dept: PLASTIC SURGERY | Facility: CLINIC | Age: 67
End: 2022-03-17
Payer: COMMERCIAL

## 2022-03-17 VITALS
OXYGEN SATURATION: 95 % | DIASTOLIC BLOOD PRESSURE: 76 MMHG | BODY MASS INDEX: 37.9 KG/M2 | HEART RATE: 111 BPM | TEMPERATURE: 97.7 F | SYSTOLIC BLOOD PRESSURE: 149 MMHG | WEIGHT: 222 LBS | HEIGHT: 64 IN

## 2022-03-17 PROCEDURE — 99024 POSTOP FOLLOW-UP VISIT: CPT

## 2022-03-18 ENCOUNTER — APPOINTMENT (OUTPATIENT)
Dept: RADIATION ONCOLOGY | Facility: CLINIC | Age: 67
End: 2022-03-18
Payer: COMMERCIAL

## 2022-03-18 VITALS
BODY MASS INDEX: 38.43 KG/M2 | DIASTOLIC BLOOD PRESSURE: 82 MMHG | OXYGEN SATURATION: 97 % | HEART RATE: 76 BPM | SYSTOLIC BLOOD PRESSURE: 155 MMHG | WEIGHT: 225.1 LBS | HEIGHT: 64 IN | RESPIRATION RATE: 16 BRPM | TEMPERATURE: 98 F

## 2022-03-18 DIAGNOSIS — Z92.29 PERSONAL HISTORY OF OTHER DRUG THERAPY: ICD-10-CM

## 2022-03-18 DIAGNOSIS — E23.0 HYPOPITUITARISM: ICD-10-CM

## 2022-03-18 PROCEDURE — 99204 OFFICE O/P NEW MOD 45 MIN: CPT | Mod: 25

## 2022-03-18 RX ORDER — OXYCODONE AND ACETAMINOPHEN 5; 325 MG/1; MG/1
5-325 TABLET ORAL
Qty: 12 | Refills: 0 | Status: DISCONTINUED | COMMUNITY
Start: 2022-02-18 | End: 2022-03-18

## 2022-03-18 RX ORDER — ACETAMINOPHEN 500 MG/1
TABLET, COATED ORAL
Refills: 0 | Status: ACTIVE | COMMUNITY

## 2022-03-18 RX ORDER — PEN NEEDLE, DIABETIC 32 GX 1/6"
32G X 4 MM NEEDLE, DISPOSABLE MISCELLANEOUS
Qty: 1 | Refills: 0 | Status: DISCONTINUED | COMMUNITY
Start: 2021-09-24 | End: 2022-03-18

## 2022-03-18 NOTE — PHYSICAL EXAM
[No UE Edema] : there is no upper extremity edema [Normal] : oriented to person, place and time, the affect was normal, the mood was normal and not anxious [de-identified] : left inner upper quadrant scar healing well

## 2022-03-18 NOTE — HISTORY OF PRESENT ILLNESS
[FreeTextEntry1] : Ms. Schwartz is a 66 year old female being seen in consultation for consideration of radiation therapy.  She is unaccompanied today. 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  SNLB 4/4 no tumor identified. ER + (100%) IA + (90%) HER2 ([facility]) 2+  HER2 ([facility]) - \par \par HPI:\par 7/2/21 -  B/L Mammogram:  Probably benign-appearing bilateral asymmetries and postsurgical changes. Comparison with the patient's prior mammogram is strongly recommended. If this does not become available and findings are not proven stable, a follow-up bilateral mammogram in 6 months would be recommended.  BIRADS 3\par \par 1/5/22 - B/L Mammogram/ Left Sono (6 month follow up):  Suspicious mass with spiculation in the posterior inner slightly upper left breast by mammography, likely corresponding to a suspicious shadowing hypoechoic mass at 10:00 on sonography. Ultrasound-guided core biopsy is recommended, followed by clip placement.    No radiographic evidence of malignancy and no significant radiographic change on the right.  Left axilla, normal-appearing lymph nodes are seen measuring 1 x 1 cm and 1.4 x 0.6 cm. No abnormal appearing lymph nodes are identified.    BIRADS  4C \par \par 1/10/22 -  underwent Left US guided core biopsy of 10:00 breast mass.  Pathology:  Invasive moderately differentiated ductal carcinoma, measures at least 0.5 cm, [place] score 6/9, DDCIS present.  ER + (100%) IA + (90%) HER2 (I[facility]) 2+  HER2 ([facility]) - \par \par 1/21/22 -  MRI Bilateral Breast:  Left upper inner breast biopsy-proven malignancy.  No suspicious enhancement in the right breast. No lymphadenopathy.     BIRADS 6\par \par 1/26/22 - Bone scan:  No definite scan evidence of osseous metastasis.  Foci of mildly increased uptake in approximately the left 7th and 8th anterior ribs at the costochondral junction, likely post traumatic.   Mild nonspecific periprosthetic uptake in the left hip.\par Degenerative disease in the spine and major joints.\par \par 2/16/22 - underwent left lumpectomy with Dr. Gonzalez and Oncoplastic closure with Dr. Sesay.  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  LN 4/4 no tumor identified. ER + (100%) IA + (90%) HER2 (I[facility]) 2+  HER2 ([facility]) - \par \par 3/7/22 -  saw Dr. ANABEL Sosa (oncology) in consultation.  Oncotype Dx pending.  \par \par 3/18/22 -  presents in consultation today. She is feeling well, has continued to heal since surgery.  She is looking forward to next steps in treatment, Oncotype Dx still pending as per Dr. Sosa's office.  \par She currently is out of work on disability, she words as an .  \par Denies any family history of cancer. \par

## 2022-03-18 NOTE — VITALS
[Maximal Pain Intensity: 0/10] : 0/10 [Least Pain Intensity: 0/10] : 0/10 [OTC] : OTC [ECOG Performance Status: 1 - Restricted in physically strenuous activity but ambulatory and able to carry out work of a light or sedentary nature] : Performance Status: 1 - Restricted in physically strenuous activity but ambulatory and able to carry out work of a light or sedentary nature, e.g., light house work, office work [Date: ____________] : Patient's last distress assessment performed on [unfilled]. [3 - Distress Level] : Distress Level: 3 [Patient given social work contact information and resource sheet] : Patient was given social work contact information and resource sheet

## 2022-03-18 NOTE — REVIEW OF SYSTEMS
[Patient Intake Form Reviewed] : Patient intake form was reviewed [Negative] : Heme/Lymph [FreeTextEntry9] : s/p left hip replacement 11/2021 - receiving PT and uses a cane to assist in walking

## 2022-03-20 NOTE — CONSULT LETTER
[Dear  ___] : Dear  [unfilled], [Consult Letter:] : I had the pleasure of evaluating your patient, [unfilled]. [Please see my note below.] : Please see my note below. [Consult Closing:] : Thank you very much for allowing me to participate in the care of this patient.  If you have any questions, please do not hesitate to contact me. [Sincerely,] : Sincerely, [DrYanick  ___] : Dr. AMEZCUA [FreeTextEntry3] : Shannan Sosa MD\par Division of Medical Oncology and Hematology\par [facility]\par [employer] [facility]\par [place], [place]

## 2022-03-20 NOTE — PHYSICAL EXAM
[Fully active, able to carry on all pre-disease performance without restriction] : Status 0 - Fully active, able to carry on all pre-disease performance without restriction [Normal] : bilateral breasts without nipple retraction, skin dimpling or palpable masses; the bilateral axillae are without adenopathy [de-identified] : left breast lumpectomy healing well

## 2022-03-20 NOTE — HISTORY OF PRESENT ILLNESS
[de-identified] : Ms. JACKI DARNELL is a 66 year old female here for an evaluation of breast cancer. Her oncologic history is as follows:\par \par This is an asymptomatic nonpalpable area identified on breast imaging.\par 1/5/2022:\par Left mammogram and sonogram performed as a 6-month follow-up to BI-RADS 3 imaging.\par Mammography:\par 1 x 0.5 cm mass in the left breast (UIQ), with spiculated margins.\par Sonogram:\par 7 x 7 x 5 mm deep shadowing hypoechoic mass with a slightly more anterior 5 x 4 x 3 mm nodule, the combination likely corresponding to the mammographic finding.\par \par 1/10/2022:\par Left breast sonogram guided core biopsy \par LEFT BREAST CANCER (7:00).\par + Infiltrating ductal carcinoma. ER/UT+ HER -\par + Associated DCIS and intermediate grade atypia.\par \par January 21, 2022, breast MRI at 450: BI-RADS 6.\par No malignancy spans approximately 1 cm.\par \par February 16, 2022, she had left breast conserving surgery.\par Oncoplastic closure: Dr. Oleg Sesay.\par Surgical pathology:\par 1. Primary tumor =1 cm.\par Negative margins.\par 2. 0/4 SLN's.\par 3. + ER/UT.\par Oncotype DX requested\par \par She takes daily injections of Norditropin. She was on HRT [id number]. Pt has ? hormone deficiency, f/b Endocrinology: Dr. Konstantin DESHPANDE.\par \par + Osteoarthritis.\par She takes daily meloxicam.\par 2021 she had a left total hip replacement at [facility].\par \par Her only other medication is Saxenda for weight loss.\par This is prescribed by her internist.\par \par GYN: Dr. Virginia TENA.\par

## 2022-03-20 NOTE — REASON FOR VISIT
[Initial Consultation] : an initial consultation [Other: _____] : [unfilled] [FreeTextEntry2] : Breast cancer

## 2022-03-20 NOTE — ASSESSMENT
[FreeTextEntry1] : This is a 66-year-old very pleasant lady, who is diagnosed with T1b, N0, M0 stage IA ER/OR + and HER-2/dori negative left breast well differentiated invasive ductal carcinoma. She is status post left breast lumpectomy and SLN bx on 2/2022. ODX pending \par \par I discussed the treatment for stage I breast cancer with the patient including the role of chemotherapy, radiation therapy and endocrine therapy. I discussed the role of Oncotype DX recurrence score in deciding the benefit from chemotherapy as well as its prognostic significance if the score is <10. I reviewed the benefit of chemotherapy for high risk Oncotype recurrence score (more than 25). She will get endocrine therapy alone if Oncotype score is low (<25) based on TAILORx data. I discussed the risks and benefits of aromatase inhibitor therapy as well as chemotherapy. \par \par I discussed the expected and unexpected side effects of chemotherapy, including but not limited to hair loss, fatigue, change in taste, mouth sores, nausea, vomiting, diarrhea, infusion reaction, allergic reaction, significant myelosuppression, need for blood transfusion, infection, bleeding, neuropathy, chemical cystitis, kidney injury, nerve pain, muscle pain, pulmonary toxicity and detrimental effect on liver and heart function. \par \par She is a candidate for Arimidex 1 mg daily for 5-10 years. I discussed the risks and benefits of aromatase inhibitor therapy including fatigue, coronary artery disease, hyperlipidemia, vaginal dryness, mood changes, hot flashes, GI disturbances, arthralgias, myalgias, and osteoporosis. I recommend her to continue calcium and vitamin D supplementation. Her medical comorbidities are managed well . She will continue to follow with her PCP for general health maintenance including fasting lipid profile every year.\par \par I will obtain Oncotype DX results as soon as they're available and will discuss the final plan with her. \par \par The patient had plenty of time to ask questions and all of her questions were answered to her satisfaction. I gave her my office phone number and encouraged her to call with any questions or additional information. \par \par

## 2022-03-22 PROCEDURE — 77262 THER RADIOLOGY TX PLNG INTRM: CPT

## 2022-03-25 ENCOUNTER — NON-APPOINTMENT (OUTPATIENT)
Age: 67
End: 2022-03-25

## 2022-03-25 PROCEDURE — 77290 THER RAD SIMULAJ FIELD CPLX: CPT | Mod: 26

## 2022-03-25 PROCEDURE — 77334 RADIATION TREATMENT AID(S): CPT | Mod: 26

## 2022-03-31 ENCOUNTER — APPOINTMENT (OUTPATIENT)
Dept: CARDIOLOGY | Facility: CLINIC | Age: 67
End: 2022-03-31
Payer: COMMERCIAL

## 2022-03-31 DIAGNOSIS — R01.1 CARDIAC MURMUR, UNSPECIFIED: ICD-10-CM

## 2022-03-31 PROCEDURE — 93306 TTE W/DOPPLER COMPLETE: CPT

## 2022-04-01 DIAGNOSIS — R11.0 NAUSEA: ICD-10-CM

## 2022-04-01 PROCEDURE — 77295 3-D RADIOTHERAPY PLAN: CPT | Mod: 26

## 2022-04-01 PROCEDURE — 77300 RADIATION THERAPY DOSE PLAN: CPT | Mod: 26

## 2022-04-01 PROCEDURE — 77334 RADIATION TREATMENT AID(S): CPT | Mod: 26

## 2022-04-06 ENCOUNTER — NON-APPOINTMENT (OUTPATIENT)
Age: 67
End: 2022-04-06

## 2022-04-06 PROCEDURE — 77280 THER RAD SIMULAJ FIELD SMPL: CPT | Mod: 26

## 2022-04-08 ENCOUNTER — NON-APPOINTMENT (OUTPATIENT)
Age: 67
End: 2022-04-08

## 2022-04-08 DIAGNOSIS — Z92.3 PERSONAL HISTORY OF IRRADIATION: ICD-10-CM

## 2022-04-08 PROCEDURE — G6002: CPT | Mod: 26

## 2022-04-11 PROCEDURE — G6002: CPT | Mod: 26

## 2022-04-13 ENCOUNTER — NON-APPOINTMENT (OUTPATIENT)
Age: 67
End: 2022-04-13

## 2022-04-13 PROCEDURE — G6002: CPT | Mod: 26

## 2022-04-13 NOTE — REVIEW OF SYSTEMS
[Edema Limbs: Grade 0] : Edema Limbs: Grade 0  [Fatigue: Grade 0] : Fatigue: Grade 0 [Breast Pain: Grade 0] : Breast Pain: Grade 0 [Pruritus: Grade 0] : Pruritus: Grade 0 [Skin Atrophy: Grade 0] : Skin Atrophy: Grade 0 [Skin Induration: Grade 0] : Skin Induration: Grade 0 [Dermatitis Radiation: Grade 0] : Dermatitis Radiation: Grade 0 [Skin Hyperpigmentation: Grade 1 - Hyperpigmentation covering <10% BSA; no psychosocial impact] : Skin Hyperpigmentation: Grade 1 - Hyperpigmentation covering <10% BSA; no psychosocial impact [FreeTextEntry6] : reinforced skin care

## 2022-04-13 NOTE — HISTORY OF PRESENT ILLNESS
[FreeTextEntry1] : Ms. Schwartz is a 66 year old female being seen for on treatment visit.  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  SNLB 4/4 no tumor identified. ER + (100%) CT + (90%) HER2 (I[facility]) 2+  HER2 ([facility]) - \par \par Oncologic Management: \par \par 2/16/22 - underwent left lumpectomy with Dr. Gonzalez and Oncoplastic closure with Dr. Sesay.  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  LN 4/4 no tumor identified. ER + (100%) CT + (90%) HER2 (I[facility]) 2+  HER2 ([facility]) - \par \par 3/7/22 -  saw Dr. ANABEL Sosa (oncology) in consultation.  Oncotype Dx score 13. Recommended to start on Anastrozole after radiation.  \par \par 4/8/22 - Radiation started to left partial breast: to receive 5 fx / 3000 cGy\par Today is 1/5 fx completed.  She is feeling well, reinforced skin care with patient, addressed all questions and concerns.  \par \par 4/13/22- Patient presents today for OTV. She completed 3/5fx  Ms. Schwartz continues to feel well, denies any pain. Continues skin care with Aquaphor .. has some darkening of scar.  She states she is staying well hydrated and does Dead sea salt baths which she finds helpful.  \par Post treatment appointment scheduled for 5/31/22, she knows to call the office if needs to adjust appointment.  Reinforced to continue skin care for next few weeks.  She is to call Dr. Sosa's office (oncology) regarding starting on Anastrozole.

## 2022-04-13 NOTE — HISTORY OF PRESENT ILLNESS
[FreeTextEntry1] : Ms. Schwartz is a 66 year old female being seen for on treatment visit.  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  SNLB 4/4 no tumor identified. ER + (100%) HI + (90%) HER2 (IHC) 2+  HER2 ([facility]) - \par \par Oncologic Management: \par \par 2/16/22 - underwent left lumpectomy with Dr. Gonzalez and Oncoplastic closure with Dr. Sesay.  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  LN 4/4 no tumor identified. ER + (100%) HI + (90%) HER2 (IHC) 2+  HER2 ([facility]) - \par \par 3/7/22 -  saw Dr. ANABEL Sosa (oncology) in consultation.  Oncotype Dx score 13. \par \par 4/8/22 - Radiation started to left partial breast: to receive 5 fx / 3000 cGy\par Today is 1/5 fx completed.  She is feeling well, reinforced skin care with patient, addressed all questions and concerns.

## 2022-04-13 NOTE — DISEASE MANAGEMENT
[Pathological] : TNM Stage: p [IB] : IB [TTNM] : 1b [NTNM] : 0 [MTNM] : 0 [de-identified] : 1800cGy [de-identified] : 3000cGy [de-identified] : Left Partial Breast

## 2022-04-13 NOTE — DISEASE MANAGEMENT
[Pathological] : TNM Stage: p [IB] : IB [TTNM] : 1b [NTNM] : 0 [MTNM] : 0 [de-identified] : 600cGy [de-identified] : 3000cGy [de-identified] : Left Partial Breast

## 2022-04-13 NOTE — REVIEW OF SYSTEMS
[Edema Limbs: Grade 0] : Edema Limbs: Grade 0  [Fatigue: Grade 0] : Fatigue: Grade 0 [Breast Pain: Grade 0] : Breast Pain: Grade 0 [Pruritus: Grade 0] : Pruritus: Grade 0 [Skin Atrophy: Grade 0] : Skin Atrophy: Grade 0 [Skin Hyperpigmentation: Grade 0] : Skin Hyperpigmentation: Grade 0 [Skin Induration: Grade 0] : Skin Induration: Grade 0 [Dermatitis Radiation: Grade 0] : Dermatitis Radiation: Grade 0 [FreeTextEntry6] : Reinforced skin care

## 2022-04-15 PROCEDURE — G6002: CPT | Mod: 26

## 2022-04-18 PROCEDURE — 77427 RADIATION TX MANAGEMENT X5: CPT

## 2022-04-18 PROCEDURE — G6002: CPT | Mod: 26

## 2022-04-23 PROBLEM — R01.1 MURMUR: Status: ACTIVE | Noted: 2022-04-23

## 2022-04-29 ENCOUNTER — APPOINTMENT (OUTPATIENT)
Dept: INTERNAL MEDICINE | Facility: CLINIC | Age: 67
End: 2022-04-29
Payer: COMMERCIAL

## 2022-04-29 VITALS
HEART RATE: 75 BPM | DIASTOLIC BLOOD PRESSURE: 80 MMHG | BODY MASS INDEX: 38.58 KG/M2 | SYSTOLIC BLOOD PRESSURE: 130 MMHG | HEIGHT: 64 IN | OXYGEN SATURATION: 98 % | WEIGHT: 226 LBS

## 2022-04-29 DIAGNOSIS — R06.00 DYSPNEA, UNSPECIFIED: ICD-10-CM

## 2022-04-29 PROCEDURE — 99214 OFFICE O/P EST MOD 30 MIN: CPT

## 2022-04-30 PROBLEM — R06.00 DYSPNEA ON EXERTION: Status: ACTIVE | Noted: 2022-03-02

## 2022-04-30 NOTE — PHYSICAL EXAM
[No Acute Distress] : no acute distress [Well Nourished] : well nourished [Well Developed] : well developed [Well-Appearing] : well-appearing [No JVD] : no jugular venous distention [Supple] : supple [No Respiratory Distress] : no respiratory distress  [No Accessory Muscle Use] : no accessory muscle use [Clear to Auscultation] : lungs were clear to auscultation bilaterally [Normal Rate] : normal rate  [Regular Rhythm] : with a regular rhythm [Normal S1, S2] : normal S1 and S2 [No Edema] : there was no peripheral edema [No Extremity Clubbing/Cyanosis] : no extremity clubbing/cyanosis [Soft] : abdomen soft [Non Tender] : non-tender

## 2022-04-30 NOTE — ASSESSMENT
[FreeTextEntry1] : 1) covid vacc x 3.  Need dates for chart.  2) PATEL - do believe relative for her she had deconditioning in the post op periods, along w fatigue/recuperation time.  Swims long distances daily prior to the surgeries, has always tolerated well.  Has no vascular risk factors.  Post THR TE dz ruled out.  Had  no issues on echo. Improving.  3) having diarrhea with all her medical stressors - had colonoscopy 2019, sticks to a mostly gluten free diet, engages in a lot of healthy practices w diet and seeing integrative/holistic provider.  Despite this, likely having looser stools as connection to events.  She is also preparing to get back to work as planned, and had a scare about balance being off.  Very engaged in her PT, etc.  Also making decision re: tamoxifen as she had ERPR+ DCIS - she is hesitant somewhat - we discussed that further too.  She will use immodium prn - reviewed.  If not completely helpful, discussed choosing a medicinal fiber and adding 1/2 dose AM x 1 week, then full dose and observe her motility/BMs.  She understands plan and will report back.  4) CPE due in Sept.

## 2022-04-30 NOTE — REVIEW OF SYSTEMS
[Wheezing] : no wheezing [Cough] : no cough [Negative] : Genitourinary [FreeTextEntry6] : see hpi  [FreeTextEntry7] : see hpi

## 2022-04-30 NOTE — HISTORY OF PRESENT ILLNESS
[FreeTextEntry1] : Here for f/u and to discuss windedness and looser stools she's been having since her surgical interventions [de-identified] : Now s/p L THR and lumpectomy/radiation to L breast for DCIS found on her routine mammo.  She tolerated the radiation fairly well it seems.  Through the post op period for her hip replacement, had some windedness.- had CTa chest and echo, both of which showed no troubles - had no TE complication and had no altered EF or structural issues on Echo.  Slowly she has had better tolerance for her activity, doing PT with mostly good wind/tolerance for it - sometimes they take breaks.  Got worried when she had some 'balance' troubles recently and was seen at [facility], then w PT - had no limb length difference, not felt to be issue w replacement.  Then dealt with the breast CA - through all these weeks she's having diarrhea mostly.  Has no pain, no blood/mucus, no fever, no anorexia.  Was on saxenda for weight loss before the hip surgery - if anything she had constipation with it, not diarrhea.  \par \par We discussed  IBS by end of visit - she does realize that the diarrhea picks up with any angst/anticipatory nervousness for any of her new medical/surgical issues.

## 2022-04-30 NOTE — COUNSELING
[Benefits of weight loss discussed] : Benefits of weight loss discussed [Encouraged to increase physical activity] : Encouraged to increase physical activity [____ min/wk Activity] : [unfilled] min/wk activity

## 2022-05-03 ENCOUNTER — NON-APPOINTMENT (OUTPATIENT)
Age: 67
End: 2022-05-03

## 2022-06-14 NOTE — VITALS
[Least Pain Intensity: 0/10] : 0/10 [Maximal Pain Intensity: 0/10] : 0/10 [ECOG Performance Status: 1 - Restricted in physically strenuous activity but ambulatory and able to carry out work of a light or sedentary nature] : Performance Status: 1 - Restricted in physically strenuous activity but ambulatory and able to carry out work of a light or sedentary nature, e.g., light house work, office work

## 2022-06-15 ENCOUNTER — APPOINTMENT (OUTPATIENT)
Dept: RADIATION ONCOLOGY | Facility: CLINIC | Age: 67
End: 2022-06-15
Payer: COMMERCIAL

## 2022-06-15 VITALS
HEART RATE: 67 BPM | OXYGEN SATURATION: 97 % | SYSTOLIC BLOOD PRESSURE: 159 MMHG | RESPIRATION RATE: 17 BRPM | WEIGHT: 230 LBS | TEMPERATURE: 99.14 F | HEIGHT: 64 IN | DIASTOLIC BLOOD PRESSURE: 80 MMHG | BODY MASS INDEX: 39.27 KG/M2

## 2022-06-15 PROCEDURE — 99024 POSTOP FOLLOW-UP VISIT: CPT

## 2022-06-15 RX ORDER — BIOTIN 10 MG
TABLET ORAL
Refills: 0 | Status: ACTIVE | COMMUNITY

## 2022-06-15 RX ORDER — MELOXICAM 15 MG/1
15 TABLET ORAL
Qty: 30 | Refills: 2 | Status: DISCONTINUED | COMMUNITY
Start: 2021-08-27 | End: 2022-06-15

## 2022-06-15 RX ORDER — LOPERAMIDE HCL 2 MG
CAPSULE ORAL
Refills: 0 | Status: ACTIVE | COMMUNITY

## 2022-06-15 NOTE — PHYSICAL EXAM
[Normal] : normal heart rate and rhythm, normal S1 and S2, and no murmurs present [No UE Edema] : there is no upper extremity edema [de-identified] : anterior scar left breast, some associated mild hyperpigmentation, no radiation dermatitis

## 2022-06-15 NOTE — HISTORY OF PRESENT ILLNESS
[FreeTextEntry1] : Ms. Schwartz is a 66 year old female being seen for post treatment evaluation.  She is unaccompanied for today's visit. 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  SNLB 4/4 no tumor identified. ER + (100%) WA + (90%) HER2 (IHC) 2+  HER2 ([facility]) - \par \par Oncologic Management: \par \par 2/16/22 - underwent left lumpectomy with Dr. Gonzalez and Oncoplastic closure with Dr. Sesay.  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  LN 4/4 no tumor identified. ER + (100%) WA + (90%) HER2 (IHC) 2+  HER2 ([facility]) - \par \par 3/7/22 -  saw Dr. ANABEL Sosa (oncology) in consultation.  Oncotype Dx score 13. Recommended to start on Anastrozole after radiation.  \par \par 4/8/22 - 4/18/22:  received Radiation Therapy to LEFT partial breast,  5 fx / 3000 cGy\par \par 6/14/22 - presents for post treatment evaluation appointment.  Ms. Schwartz complains of persistent fatigue today.  Has decided not to start on Anastrozole at this time.  Her skin has healed well and denies any pain.  \par She continues to do physical therapy since her hip surgery and has returned back to work.   To follow up with Dr. Gonzalez (surgeon) about timing of next mammogram.

## 2022-06-15 NOTE — REVIEW OF SYSTEMS
[Breast Pain: Grade 0] : Breast Pain: Grade 0 [Pruritus: Grade 0] : Pruritus: Grade 0 [Skin Atrophy: Grade 0] : Skin Atrophy: Grade 0 [Skin Hyperpigmentation: Grade 1 - Hyperpigmentation covering <10% BSA; no psychosocial impact] : Skin Hyperpigmentation: Grade 1 - Hyperpigmentation covering <10% BSA; no psychosocial impact [Skin Induration: Grade 0] : Skin Induration: Grade 0 [Dermatitis Radiation: Grade 0] : Dermatitis Radiation: Grade 0 [Diarrhea: Grade 1 - Increase of <4 stools per day over baseline; mild increase in ostomy output compared to baseline] : Diarrhea: Grade 1 - Increase of <4 stools per day over baseline; mild increase in ostomy output compared to baseline [Fatigue: Grade 1 - Fatigue relieved by rest] : Fatigue: Grade 1 - Fatigue relieved by rest [FreeTextEntry3] : taking probiotic and following with her primary care  [FreeTextEntry4] : reinforced sunscreen use and skin care

## 2022-07-01 ENCOUNTER — OUTPATIENT (OUTPATIENT)
Dept: OUTPATIENT SERVICES | Facility: HOSPITAL | Age: 67
LOS: 1 days | Discharge: HOME | End: 2022-07-01

## 2022-07-01 DIAGNOSIS — C50.919 MALIGNANT NEOPLASM OF UNSPECIFIED SITE OF UNSPECIFIED FEMALE BREAST: ICD-10-CM

## 2022-07-01 DIAGNOSIS — Z96.642 PRESENCE OF LEFT ARTIFICIAL HIP JOINT: Chronic | ICD-10-CM

## 2022-07-01 DIAGNOSIS — Z98.890 OTHER SPECIFIED POSTPROCEDURAL STATES: Chronic | ICD-10-CM

## 2022-07-15 ENCOUNTER — APPOINTMENT (OUTPATIENT)
Dept: HEMATOLOGY ONCOLOGY | Facility: CLINIC | Age: 67
End: 2022-07-15

## 2022-08-16 LAB — SARS-COV-2 N GENE NPH QL NAA+PROBE: NOT DETECTED

## 2022-10-08 ENCOUNTER — NON-APPOINTMENT (OUTPATIENT)
Age: 67
End: 2022-10-08

## 2022-10-08 NOTE — PLAN
[FreeTextEntry1] : 67 y/o female, obesity, DCIS\par 10/6 increased fatigue\par 10/7 fatigue despite good sleep, some congestion\par cough productive of green sputum\par no SOB\par no fevers, no diarrhea\par +body aches\par vaccinated & boosted\par \par Paxlovid reviewed with patient, including risks, benefits and side effects.  \par Patient's current medications reviewed, only using growth hormone at this time.  Interactions checked.\par Normal GFR  \par Patient agrees to proceed with Paxlovid treatment, will send to local pharmacy\par

## 2022-10-21 ENCOUNTER — APPOINTMENT (OUTPATIENT)
Dept: INTERNAL MEDICINE | Facility: CLINIC | Age: 67
End: 2022-10-21

## 2022-10-21 VITALS
WEIGHT: 229 LBS | OXYGEN SATURATION: 97 % | HEART RATE: 65 BPM | DIASTOLIC BLOOD PRESSURE: 70 MMHG | SYSTOLIC BLOOD PRESSURE: 130 MMHG | BODY MASS INDEX: 39.09 KG/M2 | HEIGHT: 64 IN

## 2022-10-21 PROCEDURE — 99397 PER PM REEVAL EST PAT 65+ YR: CPT

## 2022-10-23 NOTE — COUNSELING
[Sleep ___ hours/day] : Sleep [unfilled] hours/day [AUDIT-C Screening administered and reviewed] : AUDIT-C Screening administered and reviewed [Benefits of weight loss discussed] : Benefits of weight loss discussed [Encouraged to increase physical activity] : Encouraged to increase physical activity [None] : None [Good understanding] : Patient has a good understanding of lifestyle changes and steps needed to achieve self management goal

## 2022-10-23 NOTE — ASSESSMENT
[FreeTextEntry1] : 1) HCM - flu vacc declined . TDAP - will discuss, also begged off. PNVX discussed - she'll consider . Shingrix encouraged . COVID vacc x 3, will have her take bivalent vacc 3 mos after recentl case.  SBE, SB, SD, sunblock, exercise/wt loss strategy discussed.  Mammos as per onc, s/p DCIS +ER/MN now getting radiation after lumpectomy . Ca/D intake reminded, bone density w + density . Colon screen nicely utd. PAPs never inflammed, hpv neg 2021 . Full labs for cpe ordered.  2) IBS - reviewed use of AM medicinal fiber.  Has no organic dz on colonoscopy, happens to eat gluten free - doubt component of celiac dz.  Believe related to her changes, medical stress - will observe.  3) s/p covid, recuperated well.  \par

## 2022-10-23 NOTE — HEALTH RISK ASSESSMENT
[Never (0 pts)] : Never (0 points) [No] : In the past 12 months have you used drugs other than those required for medical reasons? No [No falls in past year] : Patient reported no falls in the past year [0] : 2) Feeling down, depressed, or hopeless: Not at all (0) [PHQ-2 Negative - No further assessment needed] : PHQ-2 Negative - No further assessment needed [Audit-CScore] : 0 [de-identified] : walking in the pool - 5 times a week [de-identified] : trying "17 day diet" - high protein, low sugur - concept close to Glendo diet. today is day 9 [[id number]] : 0 [No Retinopathy] : No retinopathy [EyeExamDate] : 09/20 [Patient reported PAP Smear was normal] : Patient reported PAP Smear was normal [Patient reported bone density results were normal] : Patient reported bone density results were normal [Patient reported colonoscopy was normal] : Patient reported colonoscopy was normal [HIV test declined] : HIV test declined [Change in mental status noted] : No change in mental status noted [None] : None [Alone] : lives alone [Employed] : employed [Single] : single [Sexually Active] : sexually active [High Risk Behavior] : no high risk behavior [Feels Safe at Home] : Feels safe at home [Fully functional (bathing, dressing, toileting, transferring, walking, feeding)] : Fully functional (bathing, dressing, toileting, transferring, walking, feeding) [Fully functional (using the telephone, shopping, preparing meals, housekeeping, doing laundry, using] : Fully functional and needs no help or supervision to perform IADLs (using the telephone, shopping, preparing meals, housekeeping, doing laundry, using transportation, managing medications and managing finances) [Reports changes in hearing] : Reports no changes in hearing [Reports changes in vision] : Reports no changes in vision [Smoke Detector] : smoke detector [Carbon Monoxide Detector] : no carbon monoxide detector [Seat Belt] :  uses seat belt [MammogramDate] : 02/22 [MammogramComments] : + DCIS L, getting rxd.  Next as per surg/med onc [PapSmearDate] : 06/21 [PapSmearComments] : hpv neg [BoneDensityDate] : 07/21 [ColonoscopyDate] : 01/2019 [ColonoscopyComments] : Dr Redd, no polyps [de-identified] : with cat [With Patient/Caregiver] : , with patient/caregiver [Name: ___] : Health Care Proxy's Name: [unfilled]  [Relationship: ___] : Relationship: [unfilled] [AdvancecareDate] : 09/21

## 2022-10-23 NOTE — PHYSICAL EXAM
[No Acute Distress] : no acute distress [Well Nourished] : well nourished [Well Developed] : well developed [Well-Appearing] : well-appearing [Normal Sclera/Conjunctiva] : normal sclera/conjunctiva [PERRL] : pupils equal round and reactive to light [EOMI] : extraocular movements intact [Normal Outer Ear/Nose] : the outer ears and nose were normal in appearance [Normal Oropharynx] : the oropharynx was normal [No JVD] : no jugular venous distention [No Lymphadenopathy] : no lymphadenopathy [Supple] : supple [Thyroid Normal, No Nodules] : the thyroid was normal and there were no nodules present [No Respiratory Distress] : no respiratory distress  [No Accessory Muscle Use] : no accessory muscle use [Clear to Auscultation] : lungs were clear to auscultation bilaterally [Normal Rate] : normal rate  [Regular Rhythm] : with a regular rhythm [Normal S1, S2] : normal S1 and S2 [No Murmur] : no murmur heard [No Carotid Bruits] : no carotid bruits [No Abdominal Bruit] : a ~M bruit was not heard ~T in the abdomen [No Varicosities] : no varicosities [Pedal Pulses Present] : the pedal pulses are present [No Edema] : there was no peripheral edema [No Palpable Aorta] : no palpable aorta [No Extremity Clubbing/Cyanosis] : no extremity clubbing/cyanosis [Soft] : abdomen soft [Non Tender] : non-tender [Non-distended] : non-distended [No Masses] : no abdominal mass palpated [No HSM] : no HSM [Normal Bowel Sounds] : normal bowel sounds [Normal Supraclavicular Nodes] : no supraclavicular lymphadenopathy [Normal Posterior Cervical Nodes] : no posterior cervical lymphadenopathy [Normal Anterior Cervical Nodes] : no anterior cervical lymphadenopathy [No CVA Tenderness] : no CVA  tenderness [No Spinal Tenderness] : no spinal tenderness [No Joint Swelling] : no joint swelling [Grossly Normal Strength/Tone] : grossly normal strength/tone [No Rash] : no rash [No Skin Lesions] : no skin lesions [Coordination Grossly Intact] : coordination grossly intact [No Focal Deficits] : no focal deficits [Normal Gait] : normal gait [Deep Tendon Reflexes (DTR)] : deep tendon reflexes were 2+ and symmetric [Speech Grossly Normal] : speech grossly normal [Memory Grossly Normal] : memory grossly normal [Normal Affect] : the affect was normal [Alert and Oriented x3] : oriented to person, place, and time [Normal Mood] : the mood was normal [Normal Insight/Judgement] : insight and judgment were intact [de-identified] : s/p lumpectomy/plastics, having multiple exams, next mammo upcoming [de-identified] : no suspicious nevi

## 2022-10-23 NOTE — HISTORY OF PRESENT ILLNESS
[FreeTextEntry1] : Here for annual exam [de-identified] : Here for annual after busy year medically.  Has dealt with L THR, almost complete recovery with some ache/?subjective weakness in posterior upper hip area as per extensive PT she's had; also has dealt with new dx of breast CA in situ L breast now s/p lumpectomy and doing radiation for same.  Has been hesitant re: anastrazole, and hasn't proceeded with that.  Cells are ER/VA+, s/p med onc input. With her events/radiation, has dealt with IBS as well.  Immodium helps as diarrhea dominates (things go 'right through me' some meals/days) when IBS active.  She has seen improvement of it overall though.  Very yoga/mindfulness oriented, s/p recent retreat and did well with that as well, she thinks.  Thinks she's overall on the mend, and processing how much she dealth with medically overall.  \par \par Finally had a case of covid recently, s/p paxlovid.  Recuperated well, with some fatigue since.  Back to work and managing.

## 2022-11-04 DIAGNOSIS — N30.90 CYSTITIS, UNSPECIFIED W/OUT HEMATURIA: ICD-10-CM

## 2022-11-04 RX ORDER — NIRMATRELVIR AND RITONAVIR 300-100 MG
20 X 150 MG & KIT ORAL
Qty: 1 | Refills: 0 | Status: COMPLETED | COMMUNITY
Start: 2022-10-08 | End: 2022-11-04

## 2022-12-09 ENCOUNTER — RESULT REVIEW (OUTPATIENT)
Age: 67
End: 2022-12-09

## 2022-12-09 DIAGNOSIS — M79.89 OTHER SPECIFIED SOFT TISSUE DISORDERS: ICD-10-CM

## 2022-12-10 ENCOUNTER — APPOINTMENT (OUTPATIENT)
Dept: ULTRASOUND IMAGING | Facility: CLINIC | Age: 67
End: 2022-12-10

## 2022-12-10 ENCOUNTER — OUTPATIENT (OUTPATIENT)
Dept: OUTPATIENT SERVICES | Facility: HOSPITAL | Age: 67
LOS: 1 days | End: 2022-12-10
Payer: COMMERCIAL

## 2022-12-10 DIAGNOSIS — Z98.890 OTHER SPECIFIED POSTPROCEDURAL STATES: Chronic | ICD-10-CM

## 2022-12-10 DIAGNOSIS — Z96.642 PRESENCE OF LEFT ARTIFICIAL HIP JOINT: Chronic | ICD-10-CM

## 2022-12-10 DIAGNOSIS — M79.89 OTHER SPECIFIED SOFT TISSUE DISORDERS: ICD-10-CM

## 2022-12-10 PROCEDURE — 93970 EXTREMITY STUDY: CPT

## 2022-12-10 PROCEDURE — 93970 EXTREMITY STUDY: CPT | Mod: 26

## 2022-12-19 RX ORDER — SULFAMETHOXAZOLE AND TRIMETHOPRIM 800; 160 MG/1; MG/1
800-160 TABLET ORAL TWICE DAILY
Qty: 14 | Refills: 0 | Status: DISCONTINUED | COMMUNITY
Start: 2022-11-04 | End: 2022-12-19

## 2022-12-21 ENCOUNTER — APPOINTMENT (OUTPATIENT)
Dept: RADIATION ONCOLOGY | Facility: CLINIC | Age: 67
End: 2022-12-21

## 2022-12-21 VITALS
DIASTOLIC BLOOD PRESSURE: 85 MMHG | WEIGHT: 231 LBS | BODY MASS INDEX: 39.44 KG/M2 | SYSTOLIC BLOOD PRESSURE: 168 MMHG | OXYGEN SATURATION: 100 % | HEIGHT: 64 IN | TEMPERATURE: 97.7 F | RESPIRATION RATE: 18 BRPM | HEART RATE: 68 BPM

## 2022-12-21 DIAGNOSIS — C50.912 MALIGNANT NEOPLASM OF UNSPECIFIED SITE OF LEFT FEMALE BREAST: ICD-10-CM

## 2022-12-21 PROCEDURE — 99213 OFFICE O/P EST LOW 20 MIN: CPT

## 2022-12-21 NOTE — HISTORY OF PRESENT ILLNESS
[FreeTextEntry1] : Ms. Schwartz is a 67 year old female being seen for a follow up visit.   She is unaccompanied for today's visit. 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  SNLB 4/4 no tumor identified. ER + (100%) OH + (90%) HER2 ([facility]) 2+  HER2 ([facility]) - \par \par Oncologic Management: \par \par 2/16/22 - underwent left lumpectomy with Dr. Gonzalez and Oncoplastic closure with Dr. Sesay.  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  LN 4/4 no tumor identified. ER + (100%) OH + (90%) HER2 ([facility]) 2+  HER2 ([facility]) - \par \par 3/7/22 -  saw Dr. ANABEL Sosa (oncology) in consultation.  Oncotype Dx score 13. Recommended to start on Anastrozole after radiation.  \par \par 4/8/22 - 4/18/22:  received Radiation Therapy to LEFT partial breast,  5 fx / 3000 cGy\par \par 6/14/22 - presented for post treatment evaluation appointment.  Ms. Schwartz complains of persistent fatigue today.  Has decided not to start on Anastrozole at this time.  Her skin has healed well and denies any pain.  \par She continues to do physical therapy since her hip surgery and has returned back to work.   To follow up with Dr. Gonzalez (surgeon) about timing of next mammogram. \par \par 12/21/22 - presents for follow up visit.  Ms. Schwartz is doing well.  Continues to receive physical therapy.  Her skin has recovered well from radiation.  Had COVID in October.   79

## 2022-12-21 NOTE — PHYSICAL EXAM
[Sclera] : the sclera and conjunctiva were normal [Outer Ear] : the ears and nose were normal in appearance [No UE Edema] : there is no upper extremity edema [Normal] : no palpable adenopathy [de-identified] : left upper inner quadrant scar healed well. No hyperpigmentation or desquamation

## 2022-12-21 NOTE — REVIEW OF SYSTEMS
[Fatigue: Grade 0] : Fatigue: Grade 0 [Breast Pain: Grade 0] : Breast Pain: Grade 0 [Pruritus: Grade 0] : Pruritus: Grade 0 [Skin Atrophy: Grade 0] : Skin Atrophy: Grade 0 [Skin Hyperpigmentation: Grade 0] : Skin Hyperpigmentation: Grade 0 [Dermatitis Radiation: Grade 0] : Dermatitis Radiation: Grade 0 [Skin Induration: Grade 1 - Mild induration, able to move skin parallel to plane (sliding) and perpendicular to skin (pinching up)] : Skin Induration: Grade 1 - Mild induration, able to move skin parallel to plane (sliding) and perpendicular to skin (pinching up) [FreeTextEntry4] : to moisturize prn

## 2023-01-04 DIAGNOSIS — R07.89 OTHER CHEST PAIN: ICD-10-CM

## 2023-01-05 DIAGNOSIS — F41.8 OTHER SPECIFIED ANXIETY DISORDERS: ICD-10-CM

## 2023-01-06 ENCOUNTER — RESULT REVIEW (OUTPATIENT)
Age: 68
End: 2023-01-06

## 2023-01-19 ENCOUNTER — APPOINTMENT (OUTPATIENT)
Dept: PLASTIC SURGERY | Facility: CLINIC | Age: 68
End: 2023-01-19

## 2023-01-27 ENCOUNTER — OUTPATIENT (OUTPATIENT)
Dept: OUTPATIENT SERVICES | Facility: HOSPITAL | Age: 68
LOS: 1 days | End: 2023-01-27
Payer: COMMERCIAL

## 2023-01-27 ENCOUNTER — APPOINTMENT (OUTPATIENT)
Dept: CV DIAGNOSITCS | Facility: HOSPITAL | Age: 68
End: 2023-01-27

## 2023-01-27 DIAGNOSIS — Z98.890 OTHER SPECIFIED POSTPROCEDURAL STATES: Chronic | ICD-10-CM

## 2023-01-27 DIAGNOSIS — R07.89 OTHER CHEST PAIN: ICD-10-CM

## 2023-01-27 DIAGNOSIS — R06.09 OTHER FORMS OF DYSPNEA: ICD-10-CM

## 2023-01-27 DIAGNOSIS — Z96.642 PRESENCE OF LEFT ARTIFICIAL HIP JOINT: Chronic | ICD-10-CM

## 2023-01-27 PROCEDURE — 93320 DOPPLER ECHO COMPLETE: CPT | Mod: 26

## 2023-01-27 PROCEDURE — 93325 DOPPLER ECHO COLOR FLOW MAPG: CPT

## 2023-01-27 PROCEDURE — 93325 DOPPLER ECHO COLOR FLOW MAPG: CPT | Mod: 26

## 2023-01-27 PROCEDURE — 93351 STRESS TTE COMPLETE: CPT | Mod: 26

## 2023-01-27 PROCEDURE — 93351 STRESS TTE COMPLETE: CPT

## 2023-01-27 PROCEDURE — 93320 DOPPLER ECHO COMPLETE: CPT

## 2023-02-22 ENCOUNTER — OUTPATIENT (OUTPATIENT)
Dept: OUTPATIENT SERVICES | Facility: HOSPITAL | Age: 68
LOS: 1 days | End: 2023-02-22
Payer: COMMERCIAL

## 2023-02-22 ENCOUNTER — RESULT REVIEW (OUTPATIENT)
Age: 68
End: 2023-02-22

## 2023-02-22 ENCOUNTER — APPOINTMENT (OUTPATIENT)
Dept: ULTRASOUND IMAGING | Facility: IMAGING CENTER | Age: 68
End: 2023-02-22
Payer: COMMERCIAL

## 2023-02-22 ENCOUNTER — APPOINTMENT (OUTPATIENT)
Dept: MAMMOGRAPHY | Facility: IMAGING CENTER | Age: 68
End: 2023-02-22
Payer: COMMERCIAL

## 2023-02-22 DIAGNOSIS — Z98.890 OTHER SPECIFIED POSTPROCEDURAL STATES: Chronic | ICD-10-CM

## 2023-02-22 DIAGNOSIS — Z96.642 PRESENCE OF LEFT ARTIFICIAL HIP JOINT: Chronic | ICD-10-CM

## 2023-02-22 DIAGNOSIS — C50.912 MALIGNANT NEOPLASM OF UNSPECIFIED SITE OF LEFT FEMALE BREAST: ICD-10-CM

## 2023-02-22 PROCEDURE — 77066 DX MAMMO INCL CAD BI: CPT | Mod: 26

## 2023-02-22 PROCEDURE — G0279: CPT | Mod: 26

## 2023-02-22 PROCEDURE — 76641 ULTRASOUND BREAST COMPLETE: CPT | Mod: 26,50

## 2023-02-22 PROCEDURE — G0279: CPT

## 2023-02-22 PROCEDURE — 77066 DX MAMMO INCL CAD BI: CPT

## 2023-02-22 PROCEDURE — 76641 ULTRASOUND BREAST COMPLETE: CPT

## 2023-02-23 ENCOUNTER — APPOINTMENT (OUTPATIENT)
Dept: PLASTIC SURGERY | Facility: CLINIC | Age: 68
End: 2023-02-23
Payer: COMMERCIAL

## 2023-02-23 VITALS
HEIGHT: 64 IN | TEMPERATURE: 97.1 F | BODY MASS INDEX: 39.44 KG/M2 | DIASTOLIC BLOOD PRESSURE: 84 MMHG | WEIGHT: 231 LBS | SYSTOLIC BLOOD PRESSURE: 138 MMHG

## 2023-02-23 DIAGNOSIS — N64.89 OTHER SPECIFIED DISORDERS OF BREAST: ICD-10-CM

## 2023-02-23 PROCEDURE — 99213 OFFICE O/P EST LOW 20 MIN: CPT

## 2023-03-10 PROBLEM — N64.89 DEFORMITY OF BREAST: Status: ACTIVE | Noted: 2023-03-10

## 2023-04-17 NOTE — H&P PST ADULT - TEACHING/LEARNING EDUCATIONAL LEVEL
Occupational Therapy Discharge    Visit Type: Discharge Summary  Visit: 14  Referring Provider: Lopez Young DO  Medical Diagnosis (from order): Diagnosis Information    Diagnosis  729.5 (ICD-9-CM) - M79.644 (ICD-10-CM) - Pain in right finger(s)         SUBJECTIVE                                                                                                               \"I'm getting stronger\"     Pain / Symptoms  - Pain rating (out of 10): ; Worst: 2     OBJECTIVE                                                                                                                     Strength  (out of 5 unless noted, standard test position unless noted)   : (lbs)    - Neutral:        • Left: Trial(s): 78, 75, 72, Average: 75        • Right: Trial(s): 70, 70, 70, Average: 70  Pinch: (lbs)    - Lateral:         • Left: Trial(s): 17        • Right: Trial(s): 13    - 3 Point:         • Left: Trial(s) 15        • Right: Trial(s): 15    - Tip:         • Left: Trial(s): 10        • Right: Trial(s): 10         Palpation  Tender radial aspect of PIP joint             Circumference (hand specific)  Middle Finger     - Mid Proximal Phalange: Right: 5.6 cm     - Proximal Interphalangeal Joint: Right: 6.2 cm     Outcome/Assessments  Outcome Measures:   Quick Disabilities of the Arm, Shoulder and Hand: QuickDash Total Score (Score will not calculate if more then 2 questions are left blank): 13.64  (scored 0-100; a higher score indicates greater disability) see flowsheet for additional documentation        Treatment     Therapeutic Exercise  PROM PIP and DIP flexion to LLPS composite fist and extension of PIP joint   Gentle progressive extension of finger with manual assist to glide tissue   ORL DIP flexion stretch for PIP extension 10x   Progression to green firm resistance putty strengthening rolling using extension of fingers, composite gripping, 3 pt pinch , lumbrical pinch pull  Abducted finger press in putty for  strengthening intrinsics, needed for TotalTakeoutinet playing.    Manual Therapy   Edema massage for PIP joint swelling.  STM  to radial aspect and volar aspect of PIP for improved tissue healing and extensibility, pain modulation   Mini massager to radial PIP soft tissue for pain       Therapeutic Activity  Continue buddy strap added to IF/MF for use while walking dog to off load the MF from the heavy  - as needed   Activity analysis of R hand use for Edynt, hand mechanics needed for strength to press and manipulate instrument    Skilled input: verbal instruction/cues, demonstration and tactile instruction/cues    Writer verbally educated and received verbal consent for hand placement, positioning of patient, and techniques to be performed today from patient for hand placement and palpation for techniques as described above and how they are pertinent to the patient's plan of care.    Home Exercise Program  Access Code: [email or web address]  URL: [email or web address]  Date: 02/10/2023  Prepared by: Susanna Salcedo    Exercises  ? Wrist Tendon Gliding - 3 x daily - 7 x weekly - 3 sets - 10 reps  ? Hand AROM PIP Blocking - 3 x daily - 7 x weekly - 3 sets - 10 reps  ? Seated Finger PIP AROM - 3 x daily - 7 x weekly - 3 sets - 10 reps  ? Seated Isometric Finger Adduction - 3 x daily - 7 x weekly - 3 sets - 10 reps  ? Rolling - 1 x daily - 7 x weekly - 20 reps - ex every other day  ? Seated Finger Composite Flexion with Putty - 1 x daily - 7 x weekly - 20 reps  ? Seated Finger MP Flexion with Putty - 1 x daily - 7 x weekly - 20 reps     4/17/23 Upgrade to green resistance putty       ASSESSMENT                                                                                                          To date the patient has made gains as expected.    Consistently improved making a fist. Minimal lag in extension of middle finger, which sometimes leads to soreness. Adduction of finger discomfort at times due to  radial PIP joint soft tissue pain.   Nice gains made in R  strength. Upgraded HEP provided for pt progression of resistance.   Slowly improving edema of middle finger, but may take some more time for swelling to further improve.  Pt will continue with HEP and is appropriate for discharge from OT at this time.  Education:   - Results of above outlined education: Verbalizes understanding and Demonstrates understanding    PLAN                                                                                                                           Discharge from skilled therapy with instructions/recommendations to: continue home exercise program      Goals  Decrease pain/symptoms to 0-1/10  Improve involved ROM finger (IF/MF)  composite flexion HURST to level of DPC- met   finger extension to at least 5 deg or better for contracture prevention - progressing to ~ 6 deg     The above improvements in impairments to assist in obtaining goals listed below  Long Term Goals: to be met by end of plan of care   1. Patient will achieve active fingertip to palm composite flexion for resumption of small object grasp and hold, use (knife/utensil, flute) Status: met   2. Patient will achieve active finger extension sufficient to reach into pants pocket, wipe flat surface or face without pain.Status: partially met  Sometimes has pain with full extension   3. Patient will type and use devices without reported difficulty Status: met   4. Patient will be independent with progressed and modified home exercise program.Status: met   5.  Additional goal:  Increase R  strength to improve ease with container management to without difficulty - met strength goal improvement, and functional strength is at mild difficulty for containers.      Therapy procedure time and total treatment time can be found documented on the Time Entry flowsheet   [facility]

## 2023-05-25 ENCOUNTER — APPOINTMENT (OUTPATIENT)
Dept: PLASTIC SURGERY | Facility: CLINIC | Age: 68
End: 2023-05-25

## 2023-06-05 LAB
APPEARANCE: ABNORMAL
BACTERIA: ABNORMAL /HPF
BILIRUBIN URINE: ABNORMAL
BLOOD URINE: NEGATIVE
CAST: 0 /LPF
COLOR: NORMAL
EPITHELIAL CELLS: 14 /HPF
GLUCOSE QUALITATIVE U: NEGATIVE MG/DL
KETONES URINE: NEGATIVE MG/DL
LEUKOCYTE ESTERASE URINE: ABNORMAL
MICROSCOPIC-UA: NORMAL
NITRITE URINE: POSITIVE
PH URINE: 5.5
PROTEIN URINE: NORMAL MG/DL
RED BLOOD CELLS URINE: 4 /HPF
SPECIFIC GRAVITY URINE: 1.03
UROBILINOGEN URINE: 1 MG/DL
WHITE BLOOD CELLS URINE: 14 /HPF

## 2023-06-06 LAB — BACTERIA UR CULT: ABNORMAL

## 2023-10-01 PROBLEM — Z92.3 HISTORY OF RADIATION THERAPY: Status: RESOLVED | Noted: 2022-04-08 | Resolved: 2023-10-01

## 2023-10-03 ENCOUNTER — APPOINTMENT (OUTPATIENT)
Dept: GASTROENTEROLOGY | Facility: CLINIC | Age: 68
End: 2023-10-03

## 2023-10-25 ENCOUNTER — OUTPATIENT (OUTPATIENT)
Dept: OUTPATIENT SERVICES | Facility: HOSPITAL | Age: 68
LOS: 1 days | End: 2023-10-25
Payer: COMMERCIAL

## 2023-10-25 ENCOUNTER — APPOINTMENT (OUTPATIENT)
Dept: INTERNAL MEDICINE | Facility: CLINIC | Age: 68
End: 2023-10-25
Payer: COMMERCIAL

## 2023-10-25 VITALS
OXYGEN SATURATION: 97 % | SYSTOLIC BLOOD PRESSURE: 128 MMHG | WEIGHT: 239.8 LBS | HEART RATE: 66 BPM | DIASTOLIC BLOOD PRESSURE: 76 MMHG | HEIGHT: 64 IN | BODY MASS INDEX: 40.94 KG/M2

## 2023-10-25 DIAGNOSIS — Z00.00 ENCOUNTER FOR GENERAL ADULT MEDICAL EXAMINATION W/OUT ABNORMAL FINDINGS: ICD-10-CM

## 2023-10-25 DIAGNOSIS — Z96.642 PRESENCE OF LEFT ARTIFICIAL HIP JOINT: Chronic | ICD-10-CM

## 2023-10-25 DIAGNOSIS — I10 ESSENTIAL (PRIMARY) HYPERTENSION: ICD-10-CM

## 2023-10-25 DIAGNOSIS — R71.8 OTHER ABNORMALITY OF RED BLOOD CELLS: ICD-10-CM

## 2023-10-25 DIAGNOSIS — E66.01 MORBID (SEVERE) OBESITY DUE TO EXCESS CALORIES: ICD-10-CM

## 2023-10-25 DIAGNOSIS — K58.0 IRRITABLE BOWEL SYNDROME WITH DIARRHEA: ICD-10-CM

## 2023-10-25 DIAGNOSIS — M25.561 PAIN IN RIGHT KNEE: ICD-10-CM

## 2023-10-25 DIAGNOSIS — Z98.890 OTHER SPECIFIED POSTPROCEDURAL STATES: Chronic | ICD-10-CM

## 2023-10-25 PROCEDURE — G0439: CPT

## 2023-10-25 PROCEDURE — 99397 PER PM REEVAL EST PAT 65+ YR: CPT

## 2023-10-25 RX ORDER — CIPROFLOXACIN HYDROCHLORIDE 500 MG/1
500 TABLET, FILM COATED ORAL
Qty: 14 | Refills: 0 | Status: COMPLETED | COMMUNITY
Start: 2023-06-02 | End: 2023-10-25

## 2023-11-01 DIAGNOSIS — M25.561 PAIN IN RIGHT KNEE: ICD-10-CM

## 2023-11-01 DIAGNOSIS — E66.01 MORBID (SEVERE) OBESITY DUE TO EXCESS CALORIES: ICD-10-CM

## 2023-11-01 DIAGNOSIS — K58.0 IRRITABLE BOWEL SYNDROME WITH DIARRHEA: ICD-10-CM

## 2023-11-01 DIAGNOSIS — R71.8 OTHER ABNORMALITY OF RED BLOOD CELLS: ICD-10-CM

## 2023-11-01 DIAGNOSIS — Z00.00 ENCOUNTER FOR GENERAL ADULT MEDICAL EXAMINATION WITHOUT ABNORMAL FINDINGS: ICD-10-CM

## 2023-11-08 ENCOUNTER — APPOINTMENT (OUTPATIENT)
Dept: OBGYN | Facility: CLINIC | Age: 68
End: 2023-11-08
Payer: COMMERCIAL

## 2023-11-08 ENCOUNTER — RESULT REVIEW (OUTPATIENT)
Age: 68
End: 2023-11-08

## 2023-11-08 VITALS
BODY MASS INDEX: 40.8 KG/M2 | HEIGHT: 64 IN | SYSTOLIC BLOOD PRESSURE: 160 MMHG | DIASTOLIC BLOOD PRESSURE: 80 MMHG | WEIGHT: 239 LBS

## 2023-11-08 LAB
ALBUMIN SERPL ELPH-MCNC: 4.4 G/DL
ALP BLD-CCNC: 109 U/L
ALT SERPL-CCNC: 53 U/L
ANION GAP SERPL CALC-SCNC: 13 MMOL/L
AST SERPL-CCNC: 46 U/L
BILIRUB SERPL-MCNC: 1.5 MG/DL
BUN SERPL-MCNC: 17 MG/DL
CALCIUM SERPL-MCNC: 10.4 MG/DL
CHLORIDE SERPL-SCNC: 100 MMOL/L
CHOLEST SERPL-MCNC: 252 MG/DL
CO2 SERPL-SCNC: 23 MMOL/L
CREAT SERPL-MCNC: 0.78 MG/DL
EGFR: 84 ML/MIN/1.73M2
ESTIMATED AVERAGE GLUCOSE: 108 MG/DL
GLUCOSE SERPL-MCNC: 114 MG/DL
HBA1C MFR BLD HPLC: 5.4 %
HCT VFR BLD CALC: 46 %
HDLC SERPL-MCNC: 103 MG/DL
HGB BLD-MCNC: 15.3 G/DL
LDLC SERPL CALC-MCNC: 129 MG/DL
MCHC RBC-ENTMCNC: 33.3 GM/DL
MCHC RBC-ENTMCNC: 33.6 PG
MCV RBC AUTO: 101.1 FL
NONHDLC SERPL-MCNC: 149 MG/DL
PLATELET # BLD AUTO: 229 K/UL
POTASSIUM SERPL-SCNC: 4.7 MMOL/L
PROT SERPL-MCNC: 7.4 G/DL
RBC # BLD: 4.55 M/UL
RBC # FLD: 13.1 %
SODIUM SERPL-SCNC: 136 MMOL/L
TRIGL SERPL-MCNC: 119 MG/DL
VIT B12 SERPL-MCNC: 416 PG/ML
WBC # FLD AUTO: 6.55 K/UL

## 2023-11-08 PROCEDURE — 99397 PER PM REEVAL EST PAT 65+ YR: CPT

## 2023-11-08 RX ORDER — ALPRAZOLAM 0.25 MG/1
0.25 TABLET ORAL
Qty: 20 | Refills: 0 | Status: DISCONTINUED | COMMUNITY
Start: 2023-01-05 | End: 2023-11-08

## 2023-11-17 RX ORDER — DICLOFENAC SODIUM 1% 10 MG/G
1 GEL TOPICAL
Qty: 1 | Refills: 2 | Status: ACTIVE | COMMUNITY
Start: 2023-11-17 | End: 1900-01-01

## 2023-11-27 RX ORDER — SEMAGLUTIDE 0.25 MG/.5ML
0.25 INJECTION, SOLUTION SUBCUTANEOUS
Qty: 1 | Refills: 2 | Status: ACTIVE | COMMUNITY
Start: 2023-10-25 | End: 1900-01-01

## 2023-12-04 DIAGNOSIS — U07.1 COVID-19: ICD-10-CM

## 2023-12-04 RX ORDER — LIRAGLUTIDE 6 MG/ML
18 INJECTION, SOLUTION SUBCUTANEOUS
Qty: 1 | Refills: 3 | Status: COMPLETED | COMMUNITY
Start: 2021-09-24 | End: 2023-12-04

## 2023-12-20 ENCOUNTER — APPOINTMENT (OUTPATIENT)
Dept: OBGYN | Facility: CLINIC | Age: 68
End: 2023-12-20
Payer: COMMERCIAL

## 2023-12-20 VITALS — SYSTOLIC BLOOD PRESSURE: 138 MMHG | DIASTOLIC BLOOD PRESSURE: 76 MMHG

## 2023-12-20 PROCEDURE — 58100 BIOPSY OF UTERUS LINING: CPT

## 2023-12-20 NOTE — PROCEDURE
[Endometrial Biopsy] : Endometrial biopsy [Time out performed] : Pre-procedure time out performed.  Patient's name, date of birth and procedure confirmed. [Consent Obtained] : Consent obtained [Irregular Bleeding] : irregular uterine bleeding [F/U Hyperplasia] : follow-up for endometrial hyperplasia [Risks] : risks [Benefits] : benefits [Alternatives] : alternatives [Patient] : patient [Infection] : infection [Bleeding] : bleeding [Allergic Reaction] : allergic reaction [N/A] : pregnancy test not applicable [No Premedication] : No premedication [Paracervical Block] : paracervical block was performed [___ mL Injected] : [unfilled] ~UmL of lidocaine [Without Epi] : without epinephrine [Easy Passage] : Easy passage [Anteverted] : anteverted [Scant] : scant [Sent to Pathology] : placed in buffered formalin and sent for pathology [Tolerated Well] : Patient tolerated the procedure well [No Complications] : No complications [de-identified] : Edna

## 2023-12-22 RX ORDER — SEMAGLUTIDE 0.68 MG/ML
2 INJECTION, SOLUTION SUBCUTANEOUS
Qty: 3 | Refills: 3 | Status: ACTIVE | COMMUNITY
Start: 2023-12-22 | End: 1900-01-01

## 2023-12-26 RX ORDER — ONDANSETRON 4 MG/1
4 TABLET ORAL
Qty: 14 | Refills: 1 | Status: ACTIVE | COMMUNITY
Start: 2023-12-04 | End: 1900-01-01

## 2024-01-11 LAB — CORE LAB BIOPSY: NORMAL

## 2024-01-18 ENCOUNTER — OUTPATIENT (OUTPATIENT)
Dept: OUTPATIENT SERVICES | Facility: HOSPITAL | Age: 69
LOS: 1 days | End: 2024-01-18

## 2024-01-18 VITALS
WEIGHT: 233.03 LBS | TEMPERATURE: 98 F | RESPIRATION RATE: 16 BRPM | OXYGEN SATURATION: 97 % | SYSTOLIC BLOOD PRESSURE: 152 MMHG | HEIGHT: 64 IN | HEART RATE: 59 BPM | DIASTOLIC BLOOD PRESSURE: 74 MMHG

## 2024-01-18 DIAGNOSIS — G47.33 OBSTRUCTIVE SLEEP APNEA (ADULT) (PEDIATRIC): ICD-10-CM

## 2024-01-18 DIAGNOSIS — Z98.890 OTHER SPECIFIED POSTPROCEDURAL STATES: Chronic | ICD-10-CM

## 2024-01-18 DIAGNOSIS — N93.9 ABNORMAL UTERINE AND VAGINAL BLEEDING, UNSPECIFIED: ICD-10-CM

## 2024-01-18 DIAGNOSIS — Z96.642 PRESENCE OF LEFT ARTIFICIAL HIP JOINT: Chronic | ICD-10-CM

## 2024-01-18 LAB
ALBUMIN SERPL ELPH-MCNC: 3.7 G/DL — SIGNIFICANT CHANGE UP (ref 3.3–5)
ALP SERPL-CCNC: 96 U/L — SIGNIFICANT CHANGE UP (ref 40–120)
ALT FLD-CCNC: 32 U/L — SIGNIFICANT CHANGE UP (ref 4–33)
ANION GAP SERPL CALC-SCNC: 12 MMOL/L — SIGNIFICANT CHANGE UP (ref 7–14)
AST SERPL-CCNC: 33 U/L — HIGH (ref 4–32)
BILIRUB SERPL-MCNC: 0.7 MG/DL — SIGNIFICANT CHANGE UP (ref 0.2–1.2)
BUN SERPL-MCNC: 14 MG/DL — SIGNIFICANT CHANGE UP (ref 7–23)
CALCIUM SERPL-MCNC: 9.9 MG/DL — SIGNIFICANT CHANGE UP (ref 8.4–10.5)
CHLORIDE SERPL-SCNC: 99 MMOL/L — SIGNIFICANT CHANGE UP (ref 98–107)
CO2 SERPL-SCNC: 28 MMOL/L — SIGNIFICANT CHANGE UP (ref 22–31)
CREAT SERPL-MCNC: 0.86 MG/DL — SIGNIFICANT CHANGE UP (ref 0.5–1.3)
EGFR: 74 ML/MIN/1.73M2 — SIGNIFICANT CHANGE UP
GLUCOSE SERPL-MCNC: 76 MG/DL — SIGNIFICANT CHANGE UP (ref 70–99)
HCT VFR BLD CALC: 45.4 % — HIGH (ref 34.5–45)
HGB BLD-MCNC: 15 G/DL — SIGNIFICANT CHANGE UP (ref 11.5–15.5)
MCHC RBC-ENTMCNC: 33 GM/DL — SIGNIFICANT CHANGE UP (ref 32–36)
MCHC RBC-ENTMCNC: 33.2 PG — SIGNIFICANT CHANGE UP (ref 27–34)
MCV RBC AUTO: 100.4 FL — HIGH (ref 80–100)
NRBC # BLD: 0 /100 WBCS — SIGNIFICANT CHANGE UP (ref 0–0)
NRBC # FLD: 0 K/UL — SIGNIFICANT CHANGE UP (ref 0–0)
PLATELET # BLD AUTO: 208 K/UL — SIGNIFICANT CHANGE UP (ref 150–400)
POTASSIUM SERPL-MCNC: 4.3 MMOL/L — SIGNIFICANT CHANGE UP (ref 3.5–5.3)
POTASSIUM SERPL-SCNC: 4.3 MMOL/L — SIGNIFICANT CHANGE UP (ref 3.5–5.3)
PROT SERPL-MCNC: 7.2 G/DL — SIGNIFICANT CHANGE UP (ref 6–8.3)
RBC # BLD: 4.52 M/UL — SIGNIFICANT CHANGE UP (ref 3.8–5.2)
RBC # FLD: 12.9 % — SIGNIFICANT CHANGE UP (ref 10.3–14.5)
SODIUM SERPL-SCNC: 139 MMOL/L — SIGNIFICANT CHANGE UP (ref 135–145)
WBC # BLD: 6.76 K/UL — SIGNIFICANT CHANGE UP (ref 3.8–10.5)
WBC # FLD AUTO: 6.76 K/UL — SIGNIFICANT CHANGE UP (ref 3.8–10.5)

## 2024-01-18 RX ORDER — ACETAMINOPHEN 500 MG
2 TABLET ORAL
Qty: 0 | Refills: 0 | DISCHARGE

## 2024-01-18 RX ORDER — DOCUSATE SODIUM 100 MG
1 CAPSULE ORAL
Qty: 0 | Refills: 0 | DISCHARGE

## 2024-01-18 RX ORDER — SODIUM CHLORIDE 9 MG/ML
1000 INJECTION, SOLUTION INTRAVENOUS
Refills: 0 | Status: DISCONTINUED | OUTPATIENT
Start: 2024-01-25 | End: 2024-02-08

## 2024-01-18 RX ORDER — GABAPENTIN 400 MG/1
2 CAPSULE ORAL
Qty: 0 | Refills: 0 | DISCHARGE

## 2024-01-18 RX ORDER — ONDANSETRON 8 MG/1
0 TABLET, FILM COATED ORAL
Qty: 0 | Refills: 0 | DISCHARGE

## 2024-01-18 RX ORDER — MELOXICAM 15 MG/1
1 TABLET ORAL
Qty: 0 | Refills: 0 | DISCHARGE

## 2024-01-18 RX ORDER — SOMATROPIN 10 MG
0 KIT SUBCUTANEOUS
Refills: 0 | DISCHARGE

## 2024-01-18 RX ORDER — SODIUM CHLORIDE 9 MG/ML
3 INJECTION INTRAMUSCULAR; INTRAVENOUS; SUBCUTANEOUS EVERY 8 HOURS
Refills: 0 | Status: DISCONTINUED | OUTPATIENT
Start: 2024-01-25 | End: 2024-02-08

## 2024-01-18 NOTE — H&P PST ADULT - PROBLEM SELECTOR PLAN 1
Patient scheduled for surgery on: 1/25/24  Provided with verbal and written presurgical instructions  Verbalized understanding  with teach back on the following: Famotidine for GI prophylaxis     Lab specimen drawn at PST today: cbc, cmp    EKG requested

## 2024-01-18 NOTE — H&P PST ADULT - MUSCULOSKELETAL COMMENTS
right knee "bone on bone" scheduled for knee replacement next month Hx of OA, scheduled for right knee replacement

## 2024-01-18 NOTE — H&P PST ADULT - NSICDXPASTMEDICALHX_GEN_ALL_CORE_FT
PAST MEDICAL HISTORY:  Abnormal uterine and vaginal bleeding     Arthritis left hip    Breast cancer left dx 1/2022    Colon polyps     Growth hormone deficiency (human)     History of IBS     Insomnia     OA (osteoarthritis)     Obese     TIA (obstructive sleep apnea) deneis CPAP use    Other ovarian cyst     PMB (postmenopausal bleeding)     Polyp of corpus uteri     S/P radiation therapy

## 2024-01-18 NOTE — H&P PST ADULT - FUNCTIONAL STATUS
DASI score: 5.38 Denies chest pain, dyspnea, palpitations, syncope, orthopnea or claudication/4-10 METS

## 2024-01-18 NOTE — H&P PST ADULT - HISTORY OF PRESENT ILLNESS
69 y/o female with history of TIA denies CPAP use, OA, Obesity, Left Breast Cancer s/p mastectomy, radiation therapy; 12/2023 patient is s/p D&C, with  findings of endometrial polyp biopsy- atypia 12/2023,  scheduled for dilation and curettage, hysteroscopy, polypectomy with possible myosure, denies PMB, pelvic pain

## 2024-01-18 NOTE — H&P PST ADULT - ENDOCRINE
Patient:   Ananya Banerjee            MRN: [id number]            FIN: [id number]              Age:   55 years     Sex:  MALE     :  65   Associated Diagnoses:   None   Author:   Hayder Christy     HOSPITALIST DISCHARGE SUMMARY  AMISSION DATE:                                               DISCHARGE DATE:   REASON FOR ADMISSION:   DISCHARGE DIAGNOSIS: covid/see below  Primary Care Physician    Physician Name:  Jessy Self  Specialty :  [facility]   Physician Name:  Mildred Armendariz Speciality:  Oncology Consult Reason:  Dimer elevation in COVID/infection/post op setting  Physician Name:  Leoncio Christian Speciality:  CARDIOLOGY Consult Reason:  bradycardia , junctional rhythm  Physician Name:  Tobias Jensen Speciality:  CARDIOLOGY Consult Reason:  pre op clerance  Physician Name:  Rylee Ritter Speciality:  INFECTIOUS DISEASE Consult Reason:  Diabetic foot ulcer  Physician Name:  Nikunj Eisenberg Speciality:  PODIATRY Consult Reason:  diabetic foot wound  Physician Name:  Valentin Collins Speciality:  ORTHOPEDIC SURGERY Consult Reason:  Right foot wound  Physician Name:  Tiago Shah Speciality:  PULMONOLOGY Consult Reason:  diabetic foot infection  Physician Name:  Vaishnavi Cabrales Speciality:  NEPHROLOGY Consult Reason:  ESRD  PROCEDURE HISTORY:  No qualifying data available. HISTORY OF PRESENT ILLNESS:  54 yo M admitted 2020 for R diabetic foot osteo in context of PAD. He underwent I&D on 2020 and is covid positive. Hospital course:   Septic shock 2/2 diabetic complications of R foot osteo with PAD  - preop - clearance completed. acd noted. - s/p I&D 2020  - cultures : K. pneumoniae + serratia  - on dapto / cipro  -benefit from PT/OT rehab - working on home arrangments. - iv abx at home for 14 days post I&D  -- Wound care with wound vac  - monitor CK weekly while on IV dapto ( , . ..  CK   - HHN for iv abx  - fu labs. . cpk caused by daptomycin as outpt. Acute respiratory failure 2/2 covid pneumonia  - septic shock as above. - positive 5/8/2020  - on heparin gtt originally given covid SIC  - switched to argatroban because there was concern for HIT with heparin gtt  - PF4 negative. dopplers r/o DVT. . negative  - no need for heparin gtt. SQH for now - d/w heme. - eliquis  . .. ferritin 423 and ddimer 2.3.. eliquis continued  - On RA  Sinus judie c jxn rhythm  - seen by EP  - off dopamine  - monitor. . stable  Diabetic / ESRD  - volume: HD  - mbd: defer to renal  - hyperkalemia. Rosalene Homes needs dialysate adjustment for high K  --- ssi - adjust accrodingly. - remains hyper kalemic - d/w renal - will need post HD monitoring to ensure stability prior to d/c. HTN: bp meds as tolerated   Morbid obestiy  - weight loss counseling  - statin for HLD  Pulm ppx: Incentive spirometer  even at home  dc planning  home with iv abx, wound vac, HHN for PT/OT and ck. .labs to report to ID     PCP. . Dr. Jose Schwab. . will inform  Vitals:   Vitals between:   01-JUN-2020 10:33:15   TO   02-JUN-2020 10:33:15                   LAST RESULT MINIMUM MAXIMUM  Temperature 36.6 36.4 36.8  Heart Rate 78 63 661  Respiratory Rate 18 16 18  NISBP           146 146 176  NIDBP           93 75 93  NIMBP           111 100 114  SpO2                    95 95 97  Physical Exam:  General:  Alert and oriented, No acute distress. Eye:  Pupils are equal, round and reactive to light. Neck:  No carotid bruit, No jugular venous distention. Respiratory:  Respirations are non-labored, Breath sounds are equal.   Cardiovascular:  Normal rate, Regular rhythm, Normal peripheral perfusion, No edema. Gastrointestinal:  Soft, Non-tender, Normal bowel sounds. Musculoskeletal     Normal range of motion. Normal strength. Integumentary:  Warm, Dry. Neurologic:  Alert, Oriented, Normal sensory, Normal motor function.    Labs:   Labs between:  01-JUN-2020 10:33 to 02-JUN-2020 10:33  BMP:                 Na  Cl  BUN  Glu   01-JUN-2020                                     K  CO2  Cr  Ca                              4.2         POC GLU:                 Latest Result  Latest Date  Minimum  Min Date  Maximum  Max Date                             (H) 111  02-JUN-2020 (H) 111  02-JUN-2020 94 01-JUN-2020                 Radiology results:  Echocardiogram Results  STUDY CONCLUSIONSSUMMARY:1. Left ventricle: The cavity size is normal. Wall thickness is moderately   increased. There is concentric hypertrophy. Systolic function is   normal. The estimated ejection fraction is 60-65%, by single plane   method of disks. 2. Pericardium, extracardiac: A trivial pericardial effusion is identified   circumferential to the heart. Pending results:   Discharge Medication List  Home Medications (11) Active  acetaminophen oral 325 mg tablet (Tylenol) 650 mg = 2 tab, PRN, Oral, Q4H  amLODIPine oral 5 mg tablet 5 mg = 1 tab, Oral, Daily  apixaBAN oral 2.5 mg tablet 2.5 mg = 1 tab, Oral, Q12H  aspirin oral 81 mg chewable tablet 81 mg = 1 tab, Chewed, Daily  Brilinta (ticagrelor) oral 90 mg tablet 90 mg = 1 tab, Oral, BID  cinacalcet 90 mg oral tablet 90 mg = 1 tab, Oral, Daily  Cubicin 500 mg intravenous injection See Instructions  docusate sodium oral 100 mg capsule 100 mg = 1 cap, Oral, Q Bedtime  omeprazole 40 mg oral delayed release capsule 40 mg = 1 cap, Oral, Daily  sevelamer carbonate oral 800 mg tablet 2,400 mg = 3 tab, Oral, TID [with meals]  [RESTRICTED] ciprofloxacin oral 500 mg tablet (Cipro) 500 mg = 1 tab, Oral, Daily  Allergies (2) Active Reaction  NKA None Documented  No Known Medication Allergies None Documented  Patient Condition at Discharge:  Improved  CI  Advanced Care Planning:  Immunization Status:  *Pneumovax: Up to date  *Flu document: Up to date  Smoker:  AMI (CAD, CABG): x_Non Applicable   __On Hettie Valdez, BB,  CHF: _X_ Non Applicable  __ EF:__ Asa, ACE, BB.   Contraindicated due to:  DM _X_ Non Applicable  __Insulin __oral diabetic __HgA1c, BMP q 4m  Depression: _X_ Non Applicable  __Symptoms:  ANTICOAGULATION: _X_ Non Applicable __List indication, target range, __2-3 or __2.3-3.6                ______current dose  ______current PT and INR dk                ______monitoring order and name of the provider you contacted to f/u on this outpatient setting  DISCHARGE INSTRUCTIONS:  fu at HD for iv abx  woundcare with wound Vac. Ld barney labs by ID from HD center for cpk elevation from Daptomycin  Follow-up instructions:  I spent 39 minutes completing this patient's discharge. negative

## 2024-01-18 NOTE — H&P PST ADULT - NSICDXPASTSURGICALHX_GEN_ALL_CORE_FT
PAST SURGICAL HISTORY:  S/P bilateral breast reduction 2005    S/P colon polypectomy     S/P dilatation and curettage 2021, 2017    S/P hip replacement, left 11/2021    S/P lumpectomy, left breast

## 2024-01-22 ENCOUNTER — APPOINTMENT (OUTPATIENT)
Dept: OBGYN | Facility: CLINIC | Age: 69
End: 2024-01-22
Payer: COMMERCIAL

## 2024-01-22 PROCEDURE — 99213 OFFICE O/P EST LOW 20 MIN: CPT | Mod: 95

## 2024-01-22 NOTE — COUNSELING
[TextEntry] : Discussed the procedure of hysteroscopy in detail. Risks of surgery include but are not limited to pain, infection, bleeding, damage to surrounding tissues, and uterine perforation. We also discussed the risk of system absorption of the hysteroscopic fluids which can lead to fluid overload and/or electrolyte disturbances.

## 2024-01-22 NOTE — PLAN
[FreeTextEntry1] : 69 y/o  postmenopausal F presents for surgical consultation prior to D&C hysteroscopy, possible polypectomy and Mirena IUD insertion.   Discussed risks/benefits/alternatives to procedure Discussed with Dr. Gonzalez, safe to place Mirena IUD at time of procedure despite breast cancer diagnosis. Pt states that she does not want the IUD and wants to talk to her other provider who prescribes bioidentical hormones. Discussed that if the procedure shows abnormal pathology only a hormonal IUD vs pills would be the best treatment, not bioidentical hormone. Pt understands and wishes to not have IUD placed at this time and if needed will place in the office outpatient.  Will sign consents for procedure day of surgery  PSTs already performed  All questions and concerns of the patient addressed to patients apparent satisfaction.

## 2024-01-22 NOTE — HISTORY OF PRESENT ILLNESS
[FreeTextEntry1] : This visit was provided via Telehealth using real-time 2-way audio visual technology. The patient JACKI DARNELL was located at home [address] [place] [id number] at the time of the visit. The provider Virginia Manzanares was located at the medical office located in [place], [place] at the time of the visit. The patient JACKI DARNELL and provider participated in the Telehealth encounter. Verbal consent for Telehealth services was given on 2024 by the patient JACKI DARNELL.  69 y/o  postmenopausal F presents for surgical consultation prior to D&C hysteroscopy, possible polypectomy and Mirena IUD insertion.   Pt was initially seen by me in , had an episode of PMB, TVUS revealed thickened lining, EMB showed endometrial polyp, D&C hysteroscopy, polypectomy (10/21) showed endometrial polyp with focal complex hyperplasia without atypia and with mucinous differentiation. Discussed that given risk of cancer recommended either progesterone therapy vs hysterectomy. Pt was going think about her options and follow up, but she did not due to her diagnosis of breast cancer and subsequent treatment.   Pt represented on 23 for an annual and the findings of the polyp were rediscussed. We performed another EMB which showed an endometrial polyp. After discussion with Dr. Lester of gyn/onc, recommendation is for re-sampling via D&C hysteroscopy to ensure that there is no worsening pathology.  In reviewing notes, as per her breast cancer treatment, she decided not to take anastrazole after RT. As per note from Dr. Edgar, pt was supposed to follow up with Dr. Gonzalez but her understanding was she does not need to follow up. Has not taken bioidentical hormones in 2 years.

## 2024-01-23 ENCOUNTER — NON-APPOINTMENT (OUTPATIENT)
Age: 69
End: 2024-01-23

## 2024-01-24 ENCOUNTER — TRANSCRIPTION ENCOUNTER (OUTPATIENT)
Age: 69
End: 2024-01-24

## 2024-01-24 NOTE — ASU PATIENT PROFILE, ADULT - AS SC BRADEN ACTIVITY
Benefits, risks, and possible complications of procedure explained to patient/caregiver who verbalized understanding and gave written consent. (4) walks frequently

## 2024-01-25 ENCOUNTER — TRANSCRIPTION ENCOUNTER (OUTPATIENT)
Age: 69
End: 2024-01-25

## 2024-01-25 ENCOUNTER — RESULT REVIEW (OUTPATIENT)
Age: 69
End: 2024-01-25

## 2024-01-25 ENCOUNTER — APPOINTMENT (OUTPATIENT)
Dept: OBGYN | Facility: HOSPITAL | Age: 69
End: 2024-01-25

## 2024-01-25 ENCOUNTER — OUTPATIENT (OUTPATIENT)
Dept: OUTPATIENT SERVICES | Facility: HOSPITAL | Age: 69
LOS: 1 days | Discharge: HOME | End: 2024-01-25
Payer: COMMERCIAL

## 2024-01-25 VITALS
TEMPERATURE: 98 F | OXYGEN SATURATION: 97 % | SYSTOLIC BLOOD PRESSURE: 164 MMHG | HEART RATE: 83 BPM | WEIGHT: 233.03 LBS | RESPIRATION RATE: 15 BRPM | DIASTOLIC BLOOD PRESSURE: 79 MMHG | HEIGHT: 64 IN

## 2024-01-25 VITALS
HEART RATE: 65 BPM | DIASTOLIC BLOOD PRESSURE: 51 MMHG | OXYGEN SATURATION: 98 % | RESPIRATION RATE: 20 BRPM | SYSTOLIC BLOOD PRESSURE: 123 MMHG

## 2024-01-25 DIAGNOSIS — N93.9 ABNORMAL UTERINE AND VAGINAL BLEEDING, UNSPECIFIED: ICD-10-CM

## 2024-01-25 DIAGNOSIS — Z98.890 OTHER SPECIFIED POSTPROCEDURAL STATES: Chronic | ICD-10-CM

## 2024-01-25 DIAGNOSIS — Z96.642 PRESENCE OF LEFT ARTIFICIAL HIP JOINT: Chronic | ICD-10-CM

## 2024-01-25 PROBLEM — Z92.3 PERSONAL HISTORY OF IRRADIATION: Chronic | Status: ACTIVE | Noted: 2024-01-18

## 2024-01-25 PROBLEM — N95.0 POSTMENOPAUSAL BLEEDING: Chronic | Status: ACTIVE | Noted: 2024-01-18

## 2024-01-25 PROBLEM — M19.90 UNSPECIFIED OSTEOARTHRITIS, UNSPECIFIED SITE: Chronic | Status: ACTIVE | Noted: 2024-01-18

## 2024-01-25 PROBLEM — Z87.19 PERSONAL HISTORY OF OTHER DISEASES OF THE DIGESTIVE SYSTEM: Chronic | Status: ACTIVE | Noted: 2024-01-18

## 2024-01-25 PROCEDURE — 88305 TISSUE EXAM BY PATHOLOGIST: CPT | Mod: 26

## 2024-01-25 PROCEDURE — 58558 HYSTEROSCOPY BIOPSY: CPT

## 2024-01-25 DEVICE — MYOSURE TISSUE REMOVAL DEVICE REACH: Type: IMPLANTABLE DEVICE | Status: FUNCTIONAL

## 2024-01-25 RX ORDER — SODIUM CHLORIDE 9 MG/ML
1000 INJECTION, SOLUTION INTRAVENOUS
Refills: 0 | Status: DISCONTINUED | OUTPATIENT
Start: 2024-01-25 | End: 2024-02-08

## 2024-01-25 RX ORDER — ONDANSETRON 8 MG/1
4 TABLET, FILM COATED ORAL ONCE
Refills: 0 | Status: DISCONTINUED | OUTPATIENT
Start: 2024-01-25 | End: 2024-02-08

## 2024-01-25 RX ORDER — ACETAMINOPHEN 500 MG
2 TABLET ORAL
Refills: 0 | DISCHARGE

## 2024-01-25 RX ORDER — FENTANYL CITRATE 50 UG/ML
25 INJECTION INTRAVENOUS
Refills: 0 | Status: DISCONTINUED | OUTPATIENT
Start: 2024-01-25 | End: 2024-01-25

## 2024-01-25 RX ORDER — SOMATROPIN 10 MG
0.5 KIT SUBCUTANEOUS
Refills: 0 | DISCHARGE

## 2024-01-25 NOTE — BRIEF OPERATIVE NOTE - SPECIMENS
EMC, Uterine polyp + curettings Hydroxychloroquine Pregnancy And Lactation Text: This medication has been shown to cause fetal harm but it isn't assigned a Pregnancy Risk Category. There are small amounts excreted in breast milk.

## 2024-01-25 NOTE — BRIEF OPERATIVE NOTE - DISPOSITION
Chief Complaint   Patient presents with     Migraine     Pt states migraines have improved.      Martina Mcguire LPN on 11/29/2021 at 8:12 AM     PACU->Home

## 2024-01-25 NOTE — ASU DISCHARGE PLAN (ADULT/PEDIATRIC) - CARE PROVIDER_API CALL
Virginia Manzanares  Obstetrics and Gynecology  [address]], [address]  [place] [address]  Phone: [phone]  Fax: [phone]  Established Patient  Follow Up Time: 2 weeks

## 2024-01-25 NOTE — BRIEF OPERATIVE NOTE - OPERATION/FINDINGS
EUA w/ small uterus, albeit limited 2/2 to habitus  Hsc findings: Small ~0.5cm polyp seen in the left cornua. Vascular polypoid tissue seen within cavity. Opaque vesicular tissue near fundus. Concern for possible uterine septum when visualizing ostia EUA w/ small uterus, albeit limited 2/2 to habitus  Hsc findings: Small ~0.5cm polyp seen in the left cornua. Vascular polypoid tissue seen within cavity. Opaque vesicular tissue near fundus. Concern for possible uterine septum vs uterine adhesions when visualizing ostia

## 2024-01-25 NOTE — BRIEF OPERATIVE NOTE - NSICDXBRIEFPROCEDURE_GEN_ALL_CORE_FT
PROCEDURES:  Dilation and curettage, with uterine polypectomy and video hysteroscopy 25-Jan-2024 10:29:44  Андрей Mario

## 2024-01-25 NOTE — ASU DISCHARGE PLAN (ADULT/PEDIATRIC) - NURSING INSTRUCTIONS
You received IV Tylenol for pain management at __10:00_. Please DO NOT take any Tylenol (Acetaminophen) containing products, such as Vicodin, Percocet, Excedrin, and cold medications for the next 6 hours (until _4:00__ PM). DO NOT TAKE MORE THAN 3000 MG OF TYLENOL in a 24 hour period. You received IV Tylenol for pain management at __10:00_. Please DO NOT take any Tylenol (Acetaminophen) containing products, such as Vicodin, Percocet, Excedrin, and cold medications for the next 6 hours (until _4:00__ PM). DO NOT TAKE MORE THAN 3000 MG OF TYLENOL in a 24 hour period.  You received IV Toradol for pain management at _10:00__. Please DO NOT take Motrin/Ibuprofen/Advil/Aleve/NSAIDs (Non-Steroidal Anti-Inflammatory Drugs) for the next 6 hours (until __4:00_ PM).

## 2024-01-25 NOTE — ASU DISCHARGE PLAN (ADULT/PEDIATRIC) - NS MD DC FALL RISK RISK
For information on Fall & Injury Prevention, visit: [email or web address] OR  [email or web address] OR  https://www.cdc.gov/steadi/patient.html

## 2024-01-26 PROBLEM — K63.5 POLYP OF COLON: Chronic | Status: ACTIVE | Noted: 2024-01-18

## 2024-01-30 LAB — SURGICAL PATHOLOGY STUDY: SIGNIFICANT CHANGE UP

## 2024-02-05 ENCOUNTER — APPOINTMENT (OUTPATIENT)
Dept: OTOLARYNGOLOGY | Facility: CLINIC | Age: 69
End: 2024-02-05
Payer: COMMERCIAL

## 2024-02-05 VITALS — WEIGHT: 230 LBS | HEIGHT: 64 IN | BODY MASS INDEX: 39.27 KG/M2

## 2024-02-05 DIAGNOSIS — R49.0 DYSPHONIA: ICD-10-CM

## 2024-02-05 DIAGNOSIS — J38.4 EDEMA OF LARYNX: ICD-10-CM

## 2024-02-05 PROCEDURE — 99203 OFFICE O/P NEW LOW 30 MIN: CPT | Mod: 25

## 2024-02-05 PROCEDURE — 31575 DIAGNOSTIC LARYNGOSCOPY: CPT

## 2024-02-06 PROBLEM — R49.0 HOARSENESS: Status: ACTIVE | Noted: 2024-02-06

## 2024-02-06 PROBLEM — J38.4 VOCAL CORD EDEMA: Status: ACTIVE | Noted: 2024-02-06

## 2024-02-06 NOTE — PROCEDURE
[de-identified] : PROCEDURE: Flexible laryngoscopy SURGEON: Dr. Chavarria INDICATIONS: He was unable to tolerate a mirror exam. Assess for laryngopharynx pharyngeal reflux. cough. head and neck mass.  ANESTHESIA: The patient was placed in a sitting position.  Following application of the topical anesthetic and decongestant, exam was performed with a flexible scope.  The scope was passed along the right nasal floor to the nasopharynx.  It was then passed into the region of the middle meatus, middle turbinate, and sphenoethmoid region.  An identical procedure was performed on the left side.  The following findings were noted:  The nasal musoca was healthy appearing and the septum was roughly midline. The middle meatus and sphenoethmoid recesses were clear bilaterally. The nasopharynx   Nasopharynx: no masses, choanae patent, no adenoid tissue  Base of tongue/vallecula: no masses or asymmetry Pharyngeal walls: symmetrical. No masses. Pyriform sinuses: no lesions or pooling of secretions. Epiglottis: normal. No edema or lesions. Aryepiglottic folds: normal. No lesions.  Vocal cords: clear and mobile. No lesions. Airway patent. Arytenoids: no edema or erythema.  Interarytenoid area: no edema, erythema or lesion.   No evidence of vocal cord pathology.  No dislocation of her vocal cord.  No obstruction.

## 2024-02-06 NOTE — HISTORY OF PRESENT ILLNESS
[de-identified] : Initial visit.  Very comprehensive and reliable historian. She presents for evaluation essentially of her vocal cords. She does note that she had a history of breast cancer treated with surgery and radiation.  That was in the past. More recently on January 25, 2024 she had a GYN procedure.  She describes the procedure as a 20-minute procedure. By her description after she was extubated she was reintubated and was told that she should have her airway evaluated. Since that time she has had some hoarseness. Breathing comfortably. Has an upcoming knee surgery requiring general anesthesia at the end of February.

## 2024-02-06 NOTE — ASSESSMENT
[FreeTextEntry1] : Her ongoing throat symptoms are likely from recent intubation and then reintubation. Based on her history and exam I see no contraindication to proceeding with upcoming surgery from the standpoint of her airway. Follow-up as needed.

## 2024-02-07 ENCOUNTER — APPOINTMENT (OUTPATIENT)
Dept: OBGYN | Facility: CLINIC | Age: 69
End: 2024-02-07
Payer: COMMERCIAL

## 2024-02-07 PROCEDURE — 99214 OFFICE O/P EST MOD 30 MIN: CPT

## 2024-02-08 NOTE — PLAN
[FreeTextEntry1] : 67 y/o  postmenopausal F presents for postop visit s/p D&C, hysteroscopy and polypectomy 24.  1.Dilation and Curettage - Patient is recovering well.  No signs/symptoms of infection.  - Reviewed pathology from procedure -Patient is cleared to return to all physical activities  Discussed three options which include expectant management with TVUS q6 months and EMB as needed vs hormonal IUD vs hysterectomy. Pt is going to seek a second opinion for MSK regarding treatment. As documented in previous notes, pt was recommended progesterone therapy vs hysterectomy from prior D&C finding of polyp w focal complex hyperplasia w/o atypia. Pt did not get treatment as she was diagnosed with breast cancer, s/p surgery, RT and no aromatase inhibitors. Pts heme onc does not recommend Mirena IUD at this time. As of right now patient will choose to do expectant management and will contact us if she chooses another option.

## 2024-02-08 NOTE — HISTORY OF PRESENT ILLNESS
[FreeTextEntry1] : This visit was provided via Telehealth using real-time 2-way audio visual technology. The patient JACKI DARNELL was located at home [address] [place] [address] at the time of the visit. The provider Virginia Manzanares was located at the medical office located in [place], [place] at the time of the visit. The patient JACKI DARNELL and provider participated in the Telehealth encounter. Verbal consent for Telehealth services was given on 2024 by the patient JACKI DARNELL.  67 y/o  postmenopausal F presents for postop visit s/p D&C, hysterecopy and polypectomy 24.  No more bleeding or cramping.  Tolerating po, voiding, ambulating.

## 2024-02-20 PROBLEM — N83.299 OTHER OVARIAN CYST, UNSPECIFIED SIDE: Chronic | Status: ACTIVE | Noted: 2024-01-18

## 2024-02-23 ENCOUNTER — RESULT REVIEW (OUTPATIENT)
Age: 69
End: 2024-02-23

## 2024-02-23 ENCOUNTER — APPOINTMENT (OUTPATIENT)
Dept: MAMMOGRAPHY | Facility: CLINIC | Age: 69
End: 2024-02-23
Payer: COMMERCIAL

## 2024-02-23 PROCEDURE — G0279: CPT

## 2024-02-23 PROCEDURE — 77066 DX MAMMO INCL CAD BI: CPT

## 2024-03-22 ENCOUNTER — RX RENEWAL (OUTPATIENT)
Age: 69
End: 2024-03-22

## 2024-03-22 RX ORDER — ONDANSETRON 4 MG/1
4 TABLET, ORALLY DISINTEGRATING ORAL 3 TIMES DAILY
Qty: 20 | Refills: 1 | Status: ACTIVE | COMMUNITY
Start: 2022-04-01 | End: 1900-01-01

## 2024-03-27 ENCOUNTER — NON-APPOINTMENT (OUTPATIENT)
Age: 69
End: 2024-03-27

## 2024-04-09 RX ORDER — NIRMATRELVIR AND RITONAVIR 300-100 MG
20 X 150 MG & KIT ORAL
Qty: 1 | Refills: 0 | Status: COMPLETED | COMMUNITY
Start: 2023-12-04 | End: 2024-04-09

## 2024-04-09 RX ORDER — CIPROFLOXACIN HYDROCHLORIDE 500 MG/1
500 TABLET, FILM COATED ORAL
Qty: 14 | Refills: 0 | Status: ACTIVE | COMMUNITY
Start: 2024-03-28 | End: 1900-01-01

## 2024-04-14 ENCOUNTER — NON-APPOINTMENT (OUTPATIENT)
Age: 69
End: 2024-04-14

## 2024-04-16 DIAGNOSIS — N39.0 URINARY TRACT INFECTION, SITE NOT SPECIFIED: ICD-10-CM

## 2024-04-16 LAB — BACTERIA UR CULT: ABNORMAL

## 2024-04-22 ENCOUNTER — EMERGENCY (EMERGENCY)
Facility: HOSPITAL | Age: 69
LOS: 1 days | Discharge: HOME | End: 2024-04-22
Attending: STUDENT IN AN ORGANIZED HEALTH CARE EDUCATION/TRAINING PROGRAM
Payer: COMMERCIAL

## 2024-04-22 VITALS
SYSTOLIC BLOOD PRESSURE: 145 MMHG | HEIGHT: 64 IN | DIASTOLIC BLOOD PRESSURE: 87 MMHG | HEART RATE: 97 BPM | WEIGHT: 220.02 LBS | TEMPERATURE: 98 F | OXYGEN SATURATION: 98 % | RESPIRATION RATE: 18 BRPM

## 2024-04-22 VITALS
RESPIRATION RATE: 18 BRPM | HEART RATE: 60 BPM | OXYGEN SATURATION: 98 % | TEMPERATURE: 99 F | SYSTOLIC BLOOD PRESSURE: 134 MMHG | DIASTOLIC BLOOD PRESSURE: 76 MMHG

## 2024-04-22 DIAGNOSIS — Z98.890 OTHER SPECIFIED POSTPROCEDURAL STATES: Chronic | ICD-10-CM

## 2024-04-22 DIAGNOSIS — Z96.642 PRESENCE OF LEFT ARTIFICIAL HIP JOINT: Chronic | ICD-10-CM

## 2024-04-22 LAB
ALBUMIN SERPL ELPH-MCNC: 4.2 G/DL — SIGNIFICANT CHANGE UP (ref 3.3–5)
ALP SERPL-CCNC: 111 U/L — SIGNIFICANT CHANGE UP (ref 40–120)
ALT FLD-CCNC: 16 U/L — SIGNIFICANT CHANGE UP (ref 10–45)
ANION GAP SERPL CALC-SCNC: 14 MMOL/L — SIGNIFICANT CHANGE UP (ref 5–17)
APPEARANCE UR: ABNORMAL
AST SERPL-CCNC: 27 U/L — SIGNIFICANT CHANGE UP (ref 10–40)
BACTERIA # UR AUTO: ABNORMAL /HPF
BASOPHILS # BLD AUTO: 0.02 K/UL — SIGNIFICANT CHANGE UP (ref 0–0.2)
BASOPHILS NFR BLD AUTO: 0.3 % — SIGNIFICANT CHANGE UP (ref 0–2)
BILIRUB SERPL-MCNC: 1.7 MG/DL — HIGH (ref 0.2–1.2)
BILIRUB UR-MCNC: ABNORMAL
BUN SERPL-MCNC: 8 MG/DL — SIGNIFICANT CHANGE UP (ref 7–23)
CALCIUM SERPL-MCNC: 10.3 MG/DL — SIGNIFICANT CHANGE UP (ref 8.4–10.5)
CAST: >63 /LPF — HIGH (ref 0–4)
CHLORIDE SERPL-SCNC: 102 MMOL/L — SIGNIFICANT CHANGE UP (ref 96–108)
CO2 SERPL-SCNC: 26 MMOL/L — SIGNIFICANT CHANGE UP (ref 22–31)
COARSE GRAN CASTS #/AREA URNS AUTO: PRESENT
COLOR SPEC: SIGNIFICANT CHANGE UP
CREAT SERPL-MCNC: 1.12 MG/DL — SIGNIFICANT CHANGE UP (ref 0.5–1.3)
DIFF PNL FLD: NEGATIVE — SIGNIFICANT CHANGE UP
EGFR: 54 ML/MIN/1.73M2 — LOW
EOSINOPHIL # BLD AUTO: 0.15 K/UL — SIGNIFICANT CHANGE UP (ref 0–0.5)
EOSINOPHIL NFR BLD AUTO: 1.9 % — SIGNIFICANT CHANGE UP (ref 0–6)
GLUCOSE SERPL-MCNC: 125 MG/DL — HIGH (ref 70–99)
GLUCOSE UR QL: NEGATIVE MG/DL — SIGNIFICANT CHANGE UP
HCT VFR BLD CALC: 52.5 % — HIGH (ref 34.5–45)
HGB BLD-MCNC: 17.5 G/DL — HIGH (ref 11.5–15.5)
HYALINE CASTS # UR AUTO: PRESENT
IMM GRANULOCYTES NFR BLD AUTO: 0.5 % — SIGNIFICANT CHANGE UP (ref 0–0.9)
KETONES UR-MCNC: ABNORMAL MG/DL
LEUKOCYTE ESTERASE UR-ACNC: ABNORMAL
LIDOCAIN IGE QN: 25 U/L — SIGNIFICANT CHANGE UP (ref 7–60)
LYMPHOCYTES # BLD AUTO: 1.56 K/UL — SIGNIFICANT CHANGE UP (ref 1–3.3)
LYMPHOCYTES # BLD AUTO: 20.2 % — SIGNIFICANT CHANGE UP (ref 13–44)
MCHC RBC-ENTMCNC: 33.3 GM/DL — SIGNIFICANT CHANGE UP (ref 32–36)
MCHC RBC-ENTMCNC: 34 PG — SIGNIFICANT CHANGE UP (ref 27–34)
MCV RBC AUTO: 101.9 FL — HIGH (ref 80–100)
MONOCYTES # BLD AUTO: 0.35 K/UL — SIGNIFICANT CHANGE UP (ref 0–0.9)
MONOCYTES NFR BLD AUTO: 4.5 % — SIGNIFICANT CHANGE UP (ref 2–14)
NEUTROPHILS # BLD AUTO: 5.6 K/UL — SIGNIFICANT CHANGE UP (ref 1.8–7.4)
NEUTROPHILS NFR BLD AUTO: 72.6 % — SIGNIFICANT CHANGE UP (ref 43–77)
NITRITE UR-MCNC: NEGATIVE — SIGNIFICANT CHANGE UP
NRBC # BLD: 0 /100 WBCS — SIGNIFICANT CHANGE UP (ref 0–0)
PH UR: 5.5 — SIGNIFICANT CHANGE UP (ref 5–8)
PLATELET # BLD AUTO: 266 K/UL — SIGNIFICANT CHANGE UP (ref 150–400)
POTASSIUM SERPL-MCNC: 4 MMOL/L — SIGNIFICANT CHANGE UP (ref 3.5–5.3)
POTASSIUM SERPL-SCNC: 4 MMOL/L — SIGNIFICANT CHANGE UP (ref 3.5–5.3)
PROT SERPL-MCNC: 8.1 G/DL — SIGNIFICANT CHANGE UP (ref 6–8.3)
PROT UR-MCNC: 30 MG/DL
RBC # BLD: 5.15 M/UL — SIGNIFICANT CHANGE UP (ref 3.8–5.2)
RBC # FLD: 13.7 % — SIGNIFICANT CHANGE UP (ref 10.3–14.5)
RBC CASTS # UR COMP ASSIST: 3 /HPF — SIGNIFICANT CHANGE UP (ref 0–4)
REVIEW: SIGNIFICANT CHANGE UP
SODIUM SERPL-SCNC: 142 MMOL/L — SIGNIFICANT CHANGE UP (ref 135–145)
SP GR SPEC: >1.03 — HIGH (ref 1–1.03)
SQUAMOUS # UR AUTO: 12 /HPF — HIGH (ref 0–5)
UROBILINOGEN FLD QL: 1 MG/DL — SIGNIFICANT CHANGE UP (ref 0.2–1)
WBC # BLD: 7.72 K/UL — SIGNIFICANT CHANGE UP (ref 3.8–10.5)
WBC # FLD AUTO: 7.72 K/UL — SIGNIFICANT CHANGE UP (ref 3.8–10.5)
WBC UR QL: 14 /HPF — HIGH (ref 0–5)

## 2024-04-22 PROCEDURE — 74177 CT ABD & PELVIS W/CONTRAST: CPT | Mod: MC

## 2024-04-22 PROCEDURE — 99285 EMERGENCY DEPT VISIT HI MDM: CPT

## 2024-04-22 PROCEDURE — 96366 THER/PROPH/DIAG IV INF ADDON: CPT

## 2024-04-22 PROCEDURE — 85025 COMPLETE CBC W/AUTO DIFF WBC: CPT

## 2024-04-22 PROCEDURE — 81001 URINALYSIS AUTO W/SCOPE: CPT

## 2024-04-22 PROCEDURE — 93005 ELECTROCARDIOGRAM TRACING: CPT

## 2024-04-22 PROCEDURE — 96365 THER/PROPH/DIAG IV INF INIT: CPT

## 2024-04-22 PROCEDURE — 74177 CT ABD & PELVIS W/CONTRAST: CPT | Mod: 26,MC

## 2024-04-22 PROCEDURE — 83690 ASSAY OF LIPASE: CPT

## 2024-04-22 PROCEDURE — 87086 URINE CULTURE/COLONY COUNT: CPT

## 2024-04-22 PROCEDURE — 96375 TX/PRO/DX INJ NEW DRUG ADDON: CPT

## 2024-04-22 PROCEDURE — 80053 COMPREHEN METABOLIC PANEL: CPT

## 2024-04-22 PROCEDURE — 99285 EMERGENCY DEPT VISIT HI MDM: CPT | Mod: 25

## 2024-04-22 RX ORDER — CIPROFLOXACIN LACTATE 400MG/40ML
1 VIAL (ML) INTRAVENOUS
Qty: 14 | Refills: 0
Start: 2024-04-22 | End: 2024-04-28

## 2024-04-22 RX ORDER — ONDANSETRON 8 MG/1
1 TABLET, FILM COATED ORAL
Qty: 15 | Refills: 0
Start: 2024-04-22 | End: 2024-04-26

## 2024-04-22 RX ORDER — METRONIDAZOLE 500 MG
500 TABLET ORAL ONCE
Refills: 0 | Status: COMPLETED | OUTPATIENT
Start: 2024-04-22 | End: 2024-04-22

## 2024-04-22 RX ORDER — SODIUM CHLORIDE 9 MG/ML
1000 INJECTION INTRAMUSCULAR; INTRAVENOUS; SUBCUTANEOUS ONCE
Refills: 0 | Status: COMPLETED | OUTPATIENT
Start: 2024-04-22 | End: 2024-04-22

## 2024-04-22 RX ORDER — ACETAMINOPHEN 500 MG
1000 TABLET ORAL ONCE
Refills: 0 | Status: COMPLETED | OUTPATIENT
Start: 2024-04-22 | End: 2024-04-22

## 2024-04-22 RX ORDER — ONDANSETRON 8 MG/1
4 TABLET, FILM COATED ORAL ONCE
Refills: 0 | Status: COMPLETED | OUTPATIENT
Start: 2024-04-22 | End: 2024-04-22

## 2024-04-22 RX ORDER — METRONIDAZOLE 500 MG
1 TABLET ORAL
Qty: 21 | Refills: 0
Start: 2024-04-22 | End: 2024-04-28

## 2024-04-22 RX ORDER — CIPROFLOXACIN LACTATE 400MG/40ML
500 VIAL (ML) INTRAVENOUS ONCE
Refills: 0 | Status: COMPLETED | OUTPATIENT
Start: 2024-04-22 | End: 2024-04-22

## 2024-04-22 RX ADMIN — Medication 500 MILLIGRAM(S): at 15:16

## 2024-04-22 RX ADMIN — Medication 1000 MILLIGRAM(S): at 13:13

## 2024-04-22 RX ADMIN — Medication 400 MILLIGRAM(S): at 11:26

## 2024-04-22 RX ADMIN — SODIUM CHLORIDE 1000 MILLILITER(S): 9 INJECTION INTRAMUSCULAR; INTRAVENOUS; SUBCUTANEOUS at 13:13

## 2024-04-22 RX ADMIN — SODIUM CHLORIDE 1000 MILLILITER(S): 9 INJECTION INTRAMUSCULAR; INTRAVENOUS; SUBCUTANEOUS at 11:26

## 2024-04-22 RX ADMIN — ONDANSETRON 4 MILLIGRAM(S): 8 TABLET, FILM COATED ORAL at 11:26

## 2024-04-22 NOTE — ED ADULT NURSE REASSESSMENT NOTE - NS ED NURSE REASSESS COMMENT FT1
1554 pt calling pharmacy for her meds to be sent Pt is d/c pending her leaving [facility]

## 2024-04-22 NOTE — ED PROVIDER NOTE - PROGRESS NOTE DETAILS
CT showed ileitis.  Patient cannot tolerate amoxicillin, so we will give ciprofloxacin and Flagyl.  Patient will be discharged.    Edouard Henley MD (PGY 1)

## 2024-04-22 NOTE — ED ADULT NURSE NOTE - NSFALLHARMRISKINTERV_ED_ALL_ED

## 2024-04-22 NOTE — ED ADULT NURSE NOTE - NSFALLRISKFACTORS_ED_ALL_ED
knee replacemnt FEB/Surgery: Recent surgery, recent lower limb amputation, major abdominal or thoracic surgery

## 2024-04-22 NOTE — ED PROVIDER NOTE - PATIENT PORTAL LINK FT
You can access the FollowMyHealth Patient Portal offered by [facility] by registering at the following website: [email or web address]. By joining [facility]’s FollowMyHealth portal, you will also be able to view your health information using other applications (apps) compatible with our system.

## 2024-04-22 NOTE — ED PROVIDER NOTE - OBJECTIVE STATEMENT
68-year-old female with with recent knee replacement on oxycodone sent in due to 1 week of nausea, vomiting, abdominal pain.  Patient states she vomits every time she eats food and has intermittent abdominal pain in the periumbilical and lower quadrants.  Patient has taken Zofran with no relief.  Last bowel movement was yesterday, and pateint states she has been passing gas.  Denies fevers, chills, body aches, dysuria.

## 2024-04-22 NOTE — ED PROVIDER NOTE - CLINICAL SUMMARY MEDICAL DECISION MAKING FREE TEXT BOX
68-year-old female with with recent knee replacement on oxycodone sent in due to 1 week of nausea, vomiting, abdominal pain.  Patient states she vomits every time she eats has intermittent lower quadrant abdominal pain.  Vital stable in the emergency department.  Patient has tenderness to the left lower quadrant and right lower quadrant to palpation.  No epigastric tenderness on exam.  Negative Conn sign.  Will give Zofran and Tylenol for pain relief.  Will order abdominal pain workup including CT abdomen pelvis to evaluate for any signs of intra-abdominal pathology. 68-year-old female with with recent knee replacement on oxycodone sent in due to 1 week of nausea, vomiting, abdominal pain.  Patient states she vomits every time she eats has intermittent lower quadrant abdominal pain.  Vital stable in the emergency department.  Patient has tenderness to the left lower quadrant and right lower quadrant to palpation.  No epigastric tenderness on exam.  Negative Conn sign.  Will give Zofran and Tylenol for pain relief.  Will order abdominal pain workup including CT abdomen pelvis to evaluate for any signs of intra-abdominal pathology.    Attending MD Adorno : Patietn here with abdominal pain N/V for one week. CT with distal ileitis with small ascites. DDx viral gastroenteririts vs bacterial etiology. Will empirically rx with augmentin x 1 week and GI follow-up for possible scope. Ludmila has a gastroenterologist that she follows with.

## 2024-04-22 NOTE — ED ADULT NURSE NOTE - OBJECTIVE STATEMENT
68 year old female via triage for abd pain N/v/d since Tuesday.  Pt has abd pain unable to tolerate PO Pt denies any urinary symptoms at this time Pt s/p right knee knee sx 2/28 and uses a cane for ambulation.  No fever chills noted IV placed and bloods sent as ordered JulesN

## 2024-04-22 NOTE — ED PROVIDER NOTE - NSFOLLOWUPINSTRUCTIONS_ED_ALL_ED_FT
You were seen in the emergency department today due to nausea, vomiting, abdominal pain.  In the emergency department performed labs and imaging that showed you have ileitis.  Please take ciprofloxacin 500 mg every 12 hours for the next 7 days and Flagyl 500 mg every 8 hours for neck 7 days.  Please follow-up with your doctor within the next week for further management.  Please return to the emergency department if experience worsening abdominal pain, fevers, chills, inability to tolerate oral food, blood in stool.

## 2024-04-24 LAB
CULTURE RESULTS: SIGNIFICANT CHANGE UP
SPECIMEN SOURCE: SIGNIFICANT CHANGE UP

## 2024-05-10 ENCOUNTER — NON-APPOINTMENT (OUTPATIENT)
Age: 69
End: 2024-05-10

## 2024-05-14 ENCOUNTER — NON-APPOINTMENT (OUTPATIENT)
Age: 69
End: 2024-05-14

## 2024-05-14 LAB
APPEARANCE: CLEAR
BACTERIA UR CULT: ABNORMAL
BACTERIA: NEGATIVE /HPF
BILIRUBIN URINE: ABNORMAL
BLOOD URINE: NEGATIVE
CAST: 1 /LPF
COLOR: NORMAL
EPITHELIAL CELLS: 3 /HPF
GLUCOSE QUALITATIVE U: NEGATIVE MG/DL
KETONES URINE: NEGATIVE MG/DL
LEUKOCYTE ESTERASE URINE: ABNORMAL
MICROSCOPIC-UA: NORMAL
NITRITE URINE: POSITIVE
PH URINE: 5.5
PROTEIN URINE: NEGATIVE MG/DL
RED BLOOD CELLS URINE: 1 /HPF
SPECIFIC GRAVITY URINE: 1.02
UROBILINOGEN URINE: 1 MG/DL
WHITE BLOOD CELLS URINE: 3 /HPF

## 2024-05-15 RX ORDER — SULFAMETHOXAZOLE AND TRIMETHOPRIM 800; 160 MG/1; MG/1
800-160 TABLET ORAL
Qty: 4 | Refills: 0 | Status: ACTIVE | COMMUNITY
Start: 2024-04-16 | End: 1900-01-01

## 2024-06-03 ENCOUNTER — LABORATORY RESULT (OUTPATIENT)
Age: 69
End: 2024-06-03

## 2024-06-03 DIAGNOSIS — N39.0 URINARY TRACT INFECTION, SITE NOT SPECIFIED: ICD-10-CM

## 2024-06-03 DIAGNOSIS — B96.29 URINARY TRACT INFECTION, SITE NOT SPECIFIED: ICD-10-CM

## 2024-06-03 DIAGNOSIS — Z16.12 URINARY TRACT INFECTION, SITE NOT SPECIFIED: ICD-10-CM

## 2024-06-04 ENCOUNTER — NON-APPOINTMENT (OUTPATIENT)
Age: 69
End: 2024-06-04

## 2024-06-06 ENCOUNTER — APPOINTMENT (OUTPATIENT)
Dept: OBGYN | Facility: CLINIC | Age: 69
End: 2024-06-06
Payer: COMMERCIAL

## 2024-06-06 VITALS — DIASTOLIC BLOOD PRESSURE: 81 MMHG | HEIGHT: 64 IN | SYSTOLIC BLOOD PRESSURE: 148 MMHG

## 2024-06-06 DIAGNOSIS — N94.9 UNSPECIFIED CONDITION ASSOCIATED WITH FEMALE GENITAL ORGANS AND MENSTRUAL CYCLE: ICD-10-CM

## 2024-06-06 LAB
APPEARANCE: CLEAR
BACTERIA UR CULT: NORMAL
BILIRUBIN URINE: ABNORMAL
BLOOD URINE: NEGATIVE
COLOR: NORMAL
GLUCOSE QUALITATIVE U: NEGATIVE MG/DL
KETONES URINE: ABNORMAL MG/DL
LEUKOCYTE ESTERASE URINE: ABNORMAL
NITRITE URINE: POSITIVE
PH URINE: 6
PROTEIN URINE: NEGATIVE MG/DL
SPECIFIC GRAVITY URINE: 1.02
UROBILINOGEN URINE: 1 MG/DL

## 2024-06-06 PROCEDURE — 99213 OFFICE O/P EST LOW 20 MIN: CPT

## 2024-06-06 RX ORDER — CLOTRIMAZOLE AND BETAMETHASONE DIPROPIONATE 10; .5 MG/G; MG/G
1-0.05 CREAM TOPICAL TWICE DAILY
Qty: 1 | Refills: 1 | Status: ACTIVE | COMMUNITY
Start: 2024-06-06 | End: 1900-01-01

## 2024-06-06 NOTE — PLAN
[FreeTextEntry1] : 69 y/o  postmenopausal F presents for gyn follow up  Has symptoms of UTI, f/u [facility] Speculum exam was negative. Had some discharge around the upper labia, discussed good hygiene techniques and rx for clotrimazole cream sent  has prometrium 200mg from PCP to start hormonal therapy due to hx of hyperplasia in uterine polyp  f/u in 6 months  symptoms of nocturia; referral for urogyn given

## 2024-06-06 NOTE — PHYSICAL EXAM
[Appropriately responsive] : appropriately responsive [Alert] : alert [No Acute Distress] : no acute distress [Oriented x3] : oriented x3 [Labia Majora] : normal [Labia Minora] : normal [Atrophy] : atrophy [Normal] : normal [Uterine Adnexae] : normal

## 2024-06-06 NOTE — HISTORY OF PRESENT ILLNESS
[FreeTextEntry1] : 69 y/o  postmenopausal F presents for gyn follow up   Had knee surgery in February, had low heart rate  s/p [facility] Had UTI s/p cipro, came back again 2 weeks ago, s/p bactrim  On  had UTI symptoms, s/p cystoscopy which was negative states she has atrophic vaginitis

## 2024-06-07 LAB
APPEARANCE: CLEAR
BACTERIA: NEGATIVE /HPF
BILIRUBIN URINE: NEGATIVE
BLOOD URINE: NEGATIVE
CAST: 0 /LPF
COLOR: NORMAL
EPITHELIAL CELLS: 11 /HPF
GLUCOSE QUALITATIVE U: NEGATIVE MG/DL
KETONES URINE: NEGATIVE MG/DL
LEUKOCYTE ESTERASE URINE: ABNORMAL
MICROSCOPIC-UA: NORMAL
NITRITE URINE: NEGATIVE
PH URINE: 8.5
PROTEIN URINE: 30 MG/DL
RED BLOOD CELLS URINE: 3 /HPF
REVIEW: NORMAL
SPECIFIC GRAVITY URINE: 1.02
UROBILINOGEN URINE: 1 MG/DL
WHITE BLOOD CELLS URINE: 2 /HPF

## 2024-06-10 LAB — BACTERIA UR CULT: NORMAL

## 2024-06-12 ENCOUNTER — NON-APPOINTMENT (OUTPATIENT)
Age: 69
End: 2024-06-12

## 2024-06-12 LAB
APPEARANCE: CLEAR
BACTERIA: NEGATIVE /HPF
BILIRUBIN URINE: NEGATIVE
BLOOD URINE: NEGATIVE
CAST: 1 /LPF
COLOR: NORMAL
EPITHELIAL CELLS: 4 /HPF
GLUCOSE QUALITATIVE U: NEGATIVE MG/DL
KETONES URINE: NEGATIVE MG/DL
LEUKOCYTE ESTERASE URINE: ABNORMAL
MICROSCOPIC-UA: NORMAL
NITRITE URINE: NEGATIVE
PH URINE: 8.5
PROTEIN URINE: NEGATIVE MG/DL
RED BLOOD CELLS URINE: 3 /HPF
SPECIFIC GRAVITY URINE: 1.01
UROBILINOGEN URINE: 1 MG/DL
WHITE BLOOD CELLS URINE: 57 /HPF

## 2024-06-14 ENCOUNTER — NON-APPOINTMENT (OUTPATIENT)
Age: 69
End: 2024-06-14

## 2024-06-14 RX ORDER — FOSFOMYCIN TROMETHAMINE 3 G/1
3 POWDER ORAL
Qty: 1 | Refills: 1 | Status: ACTIVE | COMMUNITY
Start: 2024-06-14 | End: 1900-01-01

## 2024-06-19 LAB — BACTERIA UR CULT: ABNORMAL

## 2024-06-20 ENCOUNTER — APPOINTMENT (OUTPATIENT)
Dept: UROGYNECOLOGY | Facility: CLINIC | Age: 69
End: 2024-06-20
Payer: COMMERCIAL

## 2024-06-20 VITALS
WEIGHT: 211 LBS | HEART RATE: 60 BPM | HEIGHT: 64 IN | BODY MASS INDEX: 36.02 KG/M2 | DIASTOLIC BLOOD PRESSURE: 80 MMHG | SYSTOLIC BLOOD PRESSURE: 161 MMHG

## 2024-06-20 DIAGNOSIS — N39.0 URINARY TRACT INFECTION, SITE NOT SPECIFIED: ICD-10-CM

## 2024-06-20 DIAGNOSIS — N95.8 OTHER SPECIFIED MENOPAUSAL AND PERIMENOPAUSAL DISORDERS: ICD-10-CM

## 2024-06-20 DIAGNOSIS — R35.1 NOCTURIA: ICD-10-CM

## 2024-06-20 LAB
BILIRUB UR QL STRIP: NORMAL
CLARITY UR: CLEAR
COLLECTION METHOD: NORMAL
GLUCOSE UR-MCNC: NORMAL
HCG UR QL: 0.2 EU/DL
HGB UR QL STRIP.AUTO: NORMAL
KETONES UR-MCNC: NORMAL
LEUKOCYTE ESTERASE UR QL STRIP: NORMAL
NITRITE UR QL STRIP: NORMAL
PH UR STRIP: 5.5
PROT UR STRIP-MCNC: NORMAL
SP GR UR STRIP: 1.02

## 2024-06-20 PROCEDURE — 99204 OFFICE O/P NEW MOD 45 MIN: CPT | Mod: 25

## 2024-06-20 PROCEDURE — 51701 INSERT BLADDER CATHETER: CPT

## 2024-06-20 PROCEDURE — 81003 URINALYSIS AUTO W/O SCOPE: CPT | Mod: QW

## 2024-06-20 PROCEDURE — 99459 PELVIC EXAMINATION: CPT

## 2024-06-20 RX ORDER — ESTRADIOL 0.1 MG/G
0.1 CREAM VAGINAL
Qty: 1 | Refills: 3 | Status: ACTIVE | COMMUNITY
Start: 2024-06-20 | End: 1900-01-01

## 2024-06-21 NOTE — PHYSICAL EXAM
[Chaperone Present] : A chaperone was present in the examining room during all aspects of the physical examination [[id number]] : A chaperone was present during the pelvic exam. [FreeTextEntry2] : SHARIFA Merida [FreeTextEntry1] : General: Well, appearing, no acute distress HEENT: Normocephalic, atraumatic Respiratory: Speaking in full sentences comfortably, normal work of breathing and no cough during visit Extremities: No upper extremity edema noted Skin: No obvious rash or skin lesions Neuro: Alert and oriented x 3, speech is fluent, normal rate Psych: Normal mood and affect [Obese] : obese [Labia Majora] : were normal [Labia Minora] : were normal [Normal Appearance] : general appearance was normal [Atrophy] : atrophy [1] : 1 [Normal] : normal

## 2024-06-21 NOTE — HISTORY OF PRESENT ILLNESS
[FreeTextEntry1] : JACKI DARNELL is a 68-year-old presenting for evaluation of nocturia. Pt has knee replacement surgery a few months ago after which she developed a UTI.  Since April, she has had 3 UTIs (4/12, 5/8, and 6/11), all of which have been ESBL E. coli. She was most recently treated with Fosfomycin a few days ago. She denies any symptoms today. Also noticed that nocturia slightly improved after this last treatment.  She denies any issues with frequency during the daytime. She also denies urinary leakage. Denies any issues with bowel movements.  Daytime voids: 4-5  Nighttime voids: 3-4  Her fluid intake includes: 5 cups of water (coconut water), 1 cup of coffee

## 2024-06-26 LAB
APPEARANCE: ABNORMAL
BACTERIA: NEGATIVE /HPF
BILIRUBIN URINE: ABNORMAL
BLOOD URINE: NEGATIVE
CAST: 0 /LPF
COLOR: ABNORMAL
EPITHELIAL CELLS: 1 /HPF
GLUCOSE QUALITATIVE U: NEGATIVE MG/DL
KETONES URINE: NEGATIVE MG/DL
LEUKOCYTE ESTERASE URINE: ABNORMAL
MICROSCOPIC-UA: NORMAL
NITRITE URINE: POSITIVE
PH URINE: 5
PROTEIN URINE: NORMAL MG/DL
RED BLOOD CELLS URINE: 2 /HPF
REVIEW: NORMAL
SPECIFIC GRAVITY URINE: 1.02
UROBILINOGEN URINE: 1 MG/DL
WHITE BLOOD CELLS URINE: 0 /HPF

## 2024-06-27 DIAGNOSIS — Z85.3 PERSONAL HISTORY OF MALIGNANT NEOPLASM OF BREAST: ICD-10-CM

## 2024-06-27 LAB — BACTERIA UR CULT: NORMAL

## 2024-08-30 ENCOUNTER — APPOINTMENT (OUTPATIENT)
Dept: MAMMOGRAPHY | Facility: CLINIC | Age: 69
End: 2024-08-30
Payer: COMMERCIAL

## 2024-08-30 ENCOUNTER — RESULT REVIEW (OUTPATIENT)
Age: 69
End: 2024-08-30

## 2024-08-30 ENCOUNTER — APPOINTMENT (OUTPATIENT)
Dept: ULTRASOUND IMAGING | Facility: CLINIC | Age: 69
End: 2024-08-30

## 2024-08-30 DIAGNOSIS — R92.8 OTHER ABNORMAL AND INCONCLUSIVE FINDINGS ON DIAGNOSTIC IMAGING OF BREAST: ICD-10-CM

## 2024-08-30 PROCEDURE — G0279: CPT | Mod: LT

## 2024-08-30 PROCEDURE — 77065 DX MAMMO INCL CAD UNI: CPT | Mod: LT

## 2024-08-30 PROCEDURE — 77061 BREAST TOMOSYNTHESIS UNI: CPT | Mod: LT

## 2024-09-24 ENCOUNTER — RX RENEWAL (OUTPATIENT)
Age: 69
End: 2024-09-24

## 2024-10-25 ENCOUNTER — OUTPATIENT (OUTPATIENT)
Dept: OUTPATIENT SERVICES | Facility: HOSPITAL | Age: 69
LOS: 1 days | End: 2024-10-25

## 2024-10-25 ENCOUNTER — APPOINTMENT (OUTPATIENT)
Dept: INTERNAL MEDICINE | Facility: CLINIC | Age: 69
End: 2024-10-25

## 2024-10-25 VITALS
SYSTOLIC BLOOD PRESSURE: 150 MMHG | WEIGHT: 225 LBS | HEART RATE: 55 BPM | HEIGHT: 64 IN | OXYGEN SATURATION: 98 % | DIASTOLIC BLOOD PRESSURE: 80 MMHG | BODY MASS INDEX: 38.41 KG/M2

## 2024-10-25 DIAGNOSIS — Z98.890 OTHER SPECIFIED POSTPROCEDURAL STATES: Chronic | ICD-10-CM

## 2024-10-25 DIAGNOSIS — I10 ESSENTIAL (PRIMARY) HYPERTENSION: ICD-10-CM

## 2024-10-25 DIAGNOSIS — Z96.642 PRESENCE OF LEFT ARTIFICIAL HIP JOINT: Chronic | ICD-10-CM

## 2024-10-25 LAB
ALBUMIN SERPL ELPH-MCNC: 4.4 G/DL
ALP BLD-CCNC: 103 U/L
ALT SERPL-CCNC: 11 U/L
ANION GAP SERPL CALC-SCNC: 11 MMOL/L
AST SERPL-CCNC: 15 U/L
BILIRUB SERPL-MCNC: 1.4 MG/DL
BUN SERPL-MCNC: 14 MG/DL
CALCIUM SERPL-MCNC: 10 MG/DL
CHLORIDE SERPL-SCNC: 101 MMOL/L
CHOLEST SERPL-MCNC: 248 MG/DL
CO2 SERPL-SCNC: 27 MMOL/L
CREAT SERPL-MCNC: 0.82 MG/DL
EGFR: 78 ML/MIN/1.73M2
GLUCOSE SERPL-MCNC: 104 MG/DL
HCT VFR BLD CALC: 44.5 %
HDLC SERPL-MCNC: 119 MG/DL
HGB BLD-MCNC: 14.8 G/DL
LDLC SERPL CALC-MCNC: 108 MG/DL
MCHC RBC-ENTMCNC: 33.3 GM/DL
MCHC RBC-ENTMCNC: 35.1 PG
MCV RBC AUTO: 105.5 FL
NONHDLC SERPL-MCNC: 129 MG/DL
PLATELET # BLD AUTO: 246 K/UL
POTASSIUM SERPL-SCNC: 4.2 MMOL/L
PROT SERPL-MCNC: 7.5 G/DL
RBC # BLD: 4.22 M/UL
RBC # FLD: 13.1 %
SODIUM SERPL-SCNC: 139 MMOL/L
TRIGL SERPL-MCNC: 130 MG/DL
WBC # FLD AUTO: 8.48 K/UL

## 2024-10-25 PROCEDURE — 99397 PER PM REEVAL EST PAT 65+ YR: CPT

## 2024-10-25 PROCEDURE — G0439: CPT

## 2024-11-20 ENCOUNTER — APPOINTMENT (OUTPATIENT)
Dept: OBGYN | Facility: CLINIC | Age: 69
End: 2024-11-20
Payer: COMMERCIAL

## 2024-11-20 VITALS
HEIGHT: 64.5 IN | WEIGHT: 220 LBS | DIASTOLIC BLOOD PRESSURE: 80 MMHG | BODY MASS INDEX: 37.1 KG/M2 | SYSTOLIC BLOOD PRESSURE: 160 MMHG

## 2024-11-20 DIAGNOSIS — Z01.419 ENCOUNTER FOR GYNECOLOGICAL EXAMINATION (GENERAL) (ROUTINE) W/OUT ABNORMAL FINDINGS: ICD-10-CM

## 2024-11-20 PROCEDURE — 99397 PER PM REEVAL EST PAT 65+ YR: CPT

## 2024-11-20 PROCEDURE — G0444 DEPRESSION SCREEN ANNUAL: CPT | Mod: 59

## 2024-11-20 PROCEDURE — 99459 PELVIC EXAMINATION: CPT

## 2024-11-25 LAB
CYTOLOGY CVX/VAG DOC THIN PREP: NORMAL
HPV HIGH+LOW RISK DNA PNL CVX: NOT DETECTED

## 2024-11-27 ENCOUNTER — APPOINTMENT (OUTPATIENT)
Dept: UROGYNECOLOGY | Facility: CLINIC | Age: 69
End: 2024-11-27
Payer: COMMERCIAL

## 2024-11-27 DIAGNOSIS — N95.8 OTHER SPECIFIED MENOPAUSAL AND PERIMENOPAUSAL DISORDERS: ICD-10-CM

## 2024-11-27 DIAGNOSIS — R35.1 NOCTURIA: ICD-10-CM

## 2024-11-27 PROCEDURE — 99459 PELVIC EXAMINATION: CPT

## 2024-11-27 PROCEDURE — 99213 OFFICE O/P EST LOW 20 MIN: CPT

## 2024-11-27 RX ORDER — ESTRADIOL 0.1 MG/G
0.1 CREAM VAGINAL
Qty: 1 | Refills: 5 | Status: ACTIVE | COMMUNITY
Start: 2024-11-27 | End: 1900-01-01

## 2024-12-04 ENCOUNTER — APPOINTMENT (OUTPATIENT)
Dept: OBGYN | Facility: CLINIC | Age: 69
End: 2024-12-04
Payer: COMMERCIAL

## 2024-12-04 ENCOUNTER — ASOB RESULT (OUTPATIENT)
Age: 69
End: 2024-12-04

## 2024-12-04 PROCEDURE — 76830 TRANSVAGINAL US NON-OB: CPT

## 2024-12-04 PROCEDURE — 77080 DXA BONE DENSITY AXIAL: CPT

## 2025-02-14 ENCOUNTER — OUTPATIENT (OUTPATIENT)
Dept: OUTPATIENT SERVICES | Facility: HOSPITAL | Age: 70
LOS: 1 days | End: 2025-02-14
Payer: COMMERCIAL

## 2025-02-14 ENCOUNTER — APPOINTMENT (OUTPATIENT)
Dept: ULTRASOUND IMAGING | Facility: IMAGING CENTER | Age: 70
End: 2025-02-14
Payer: COMMERCIAL

## 2025-02-14 ENCOUNTER — RESULT REVIEW (OUTPATIENT)
Age: 70
End: 2025-02-14

## 2025-02-14 ENCOUNTER — APPOINTMENT (OUTPATIENT)
Dept: MAMMOGRAPHY | Facility: IMAGING CENTER | Age: 70
End: 2025-02-14
Payer: COMMERCIAL

## 2025-02-14 DIAGNOSIS — Z98.890 OTHER SPECIFIED POSTPROCEDURAL STATES: Chronic | ICD-10-CM

## 2025-02-14 DIAGNOSIS — Z96.642 PRESENCE OF LEFT ARTIFICIAL HIP JOINT: Chronic | ICD-10-CM

## 2025-02-14 DIAGNOSIS — Z01.419 ENCOUNTER FOR GYNECOLOGICAL EXAMINATION (GENERAL) (ROUTINE) WITHOUT ABNORMAL FINDINGS: ICD-10-CM

## 2025-02-14 PROCEDURE — 76641 ULTRASOUND BREAST COMPLETE: CPT | Mod: 26,50

## 2025-02-14 PROCEDURE — 77066 DX MAMMO INCL CAD BI: CPT

## 2025-02-14 PROCEDURE — G0279: CPT | Mod: 26

## 2025-02-14 PROCEDURE — 77066 DX MAMMO INCL CAD BI: CPT | Mod: 26

## 2025-02-14 PROCEDURE — G0279: CPT

## 2025-02-14 PROCEDURE — 76641 ULTRASOUND BREAST COMPLETE: CPT

## 2025-05-02 DIAGNOSIS — R60.0 LOCALIZED EDEMA: ICD-10-CM

## 2025-05-03 ENCOUNTER — APPOINTMENT (OUTPATIENT)
Dept: ULTRASOUND IMAGING | Facility: IMAGING CENTER | Age: 70
End: 2025-05-03
Payer: COMMERCIAL

## 2025-05-03 ENCOUNTER — OUTPATIENT (OUTPATIENT)
Dept: OUTPATIENT SERVICES | Facility: HOSPITAL | Age: 70
LOS: 1 days | End: 2025-05-03
Payer: COMMERCIAL

## 2025-05-03 DIAGNOSIS — Z98.890 OTHER SPECIFIED POSTPROCEDURAL STATES: Chronic | ICD-10-CM

## 2025-05-03 DIAGNOSIS — R60.0 LOCALIZED EDEMA: ICD-10-CM

## 2025-05-03 DIAGNOSIS — Z96.642 PRESENCE OF LEFT ARTIFICIAL HIP JOINT: Chronic | ICD-10-CM

## 2025-05-03 PROCEDURE — 93970 EXTREMITY STUDY: CPT | Mod: 26

## 2025-05-03 PROCEDURE — 93970 EXTREMITY STUDY: CPT

## 2025-05-06 ENCOUNTER — APPOINTMENT (OUTPATIENT)
Dept: INTERNAL MEDICINE | Facility: CLINIC | Age: 70
End: 2025-05-06
Payer: COMMERCIAL

## 2025-05-06 ENCOUNTER — OUTPATIENT (OUTPATIENT)
Dept: OUTPATIENT SERVICES | Facility: HOSPITAL | Age: 70
LOS: 1 days | End: 2025-05-06
Payer: COMMERCIAL

## 2025-05-06 VITALS
HEART RATE: 63 BPM | BODY MASS INDEX: 42.08 KG/M2 | WEIGHT: 249 LBS | SYSTOLIC BLOOD PRESSURE: 140 MMHG | DIASTOLIC BLOOD PRESSURE: 68 MMHG | OXYGEN SATURATION: 97 %

## 2025-05-06 DIAGNOSIS — Z96.642 PRESENCE OF LEFT ARTIFICIAL HIP JOINT: Chronic | ICD-10-CM

## 2025-05-06 DIAGNOSIS — Z98.890 OTHER SPECIFIED POSTPROCEDURAL STATES: Chronic | ICD-10-CM

## 2025-05-06 DIAGNOSIS — I10 ESSENTIAL (PRIMARY) HYPERTENSION: ICD-10-CM

## 2025-05-06 DIAGNOSIS — K29.70 GASTRITIS, UNSPECIFIED, W/OUT BLEEDING: ICD-10-CM

## 2025-05-06 DIAGNOSIS — I89.0 LYMPHEDEMA, NOT ELSEWHERE CLASSIFIED: ICD-10-CM

## 2025-05-06 PROCEDURE — 99214 OFFICE O/P EST MOD 30 MIN: CPT

## 2025-05-06 PROCEDURE — G0463: CPT

## 2025-05-06 PROCEDURE — G2211 COMPLEX E/M VISIT ADD ON: CPT | Mod: NC

## 2025-05-06 RX ORDER — OMEPRAZOLE 40 MG/1
40 CAPSULE, DELAYED RELEASE ORAL
Qty: 60 | Refills: 0 | Status: ACTIVE | COMMUNITY
Start: 2025-05-06 | End: 1900-01-01

## 2025-05-06 RX ORDER — FUROSEMIDE 20 MG/1
20 TABLET ORAL
Qty: 30 | Refills: 0 | Status: ACTIVE | COMMUNITY
Start: 2025-05-06 | End: 1900-01-01

## 2025-05-06 RX ORDER — ONDANSETRON 4 MG/1
4 TABLET, ORALLY DISINTEGRATING ORAL TWICE DAILY
Qty: 14 | Refills: 0 | Status: ACTIVE | COMMUNITY
Start: 2025-05-06 | End: 1900-01-01

## 2025-05-12 DIAGNOSIS — K29.70 GASTRITIS, UNSPECIFIED, WITHOUT BLEEDING: ICD-10-CM

## 2025-05-12 DIAGNOSIS — I89.0 LYMPHEDEMA, NOT ELSEWHERE CLASSIFIED: ICD-10-CM

## (undated) DEVICE — GLV 6 PROTEXIS

## (undated) DEVICE — DRAPE IRRIGATION POUCH 19X23"

## (undated) DEVICE — TUBING SUCTION NONCONDUCTIVE 6MM X 12FT

## (undated) DEVICE — GLV 7.5 PROTEXIS

## (undated) DEVICE — DRSG MAMMARY SUPPORT LG SIZE 4

## (undated) DEVICE — BLANKET WARMER LOWER ADULT

## (undated) DEVICE — SUT SOFSILK 2-0 18" C-23

## (undated) DEVICE — GOWN LG

## (undated) DEVICE — DRSG TELFA 3 X 8

## (undated) DEVICE — LIGASURE SM JAW 16.5MM 18CM

## (undated) DEVICE — SUT POLYSORB 2-0 30" V-20 UNDYED

## (undated) DEVICE — PREP BETADINE SPONGE STICKS

## (undated) DEVICE — VISITEC 4X4

## (undated) DEVICE — SUT MONOCRYL 3-0 27" PS-2 UNDYED

## (undated) DEVICE — GLV 7 PROTEXIS

## (undated) DEVICE — WARMING BLANKET UPPER ADULT

## (undated) DEVICE — DRSG STERISTRIPS 0.5X4"

## (undated) DEVICE — SUT DERMABOND 0.7ML

## (undated) DEVICE — DRAPE HALF SHEET 40X57"

## (undated) DEVICE — STAPLER SKIN VISI-STAT 35 WIDE

## (undated) DEVICE — DRAPE TOWEL BLUE 17" X 24"

## (undated) DEVICE — SOL IRR POUR NS 0.9% 500ML

## (undated) DEVICE — VENODYNE/SCD SLEEVE CALF MEDIUM

## (undated) DEVICE — DRSG MAMMARY SUPPORT MED SIZE 2

## (undated) DEVICE — CONTAINER SPECIMEN 100ML

## (undated) DEVICE — PACK MINOR

## (undated) DEVICE — DRSG MAMMARY SUPPORT MED SIZE 3

## (undated) DEVICE — DRSG COMBINE 5X9"

## (undated) DEVICE — GOWN LG W TOWEL

## (undated) DEVICE — FLUENT FMS PROCEDURE KIT

## (undated) DEVICE — DRAPE LIGHT HANDLE COVER (GREEN)

## (undated) DEVICE — GAMMA SLEEVE DISPOSABLE

## (undated) DEVICE — PACK D&C

## (undated) DEVICE — LABELS BLANK W PEN

## (undated) DEVICE — DRAPE INSTRUMENT POUCH

## (undated) DEVICE — SOL IRR POUR H2O 500ML

## (undated) DEVICE — GLV 7.5 PROTEXIS NEUTHERA

## (undated) DEVICE — DRAPE CHEST BREAST 106X122"

## (undated) DEVICE — PRESSURE INFUSOR BAG 1000ML

## (undated) DEVICE — MYOSURE SCOPE SEAL

## (undated) DEVICE — WRAP COMPRESSION CALF MED

## (undated) DEVICE — PROTECTOR HEEL / ELBOW FLUFFY

## (undated) DEVICE — DRSG TEGADERM 1.75X1.75"

## (undated) DEVICE — SOL IRR POUR NS 0.9% 1000ML

## (undated) DEVICE — SYR LUER LOK 10CC

## (undated) DEVICE — DRSG PAD SANITARY OB

## (undated) DEVICE — PACK PROCEDURAL TRAY D & C

## (undated) DEVICE — SUT MONOCRYL 4-0 27" PS-2 UNDYED

## (undated) DEVICE — SOL IRR BAG NS 0.9% 1000ML

## (undated) DEVICE — TUBING IRR SET FOR CYSTOSCOPY 77"

## (undated) DEVICE — SOL IRR POUR H2O 250ML

## (undated) DEVICE — DRSG 4X4

## (undated) DEVICE — MEDICATION LABELS W MARKER

## (undated) DEVICE — SPONGE LAP X-RAY DETECTABLE 18X18"

## (undated) DEVICE — ELCTR PENCIL NEPTUNE SMOKE EVACUATION

## (undated) DEVICE — POSITIONER STRAP ARMBOARD VELCRO TS-30